# Patient Record
Sex: MALE | RURAL
[De-identification: names, ages, dates, MRNs, and addresses within clinical notes are randomized per-mention and may not be internally consistent; named-entity substitution may affect disease eponyms.]

---

## 2020-11-25 ENCOUNTER — APPOINTMENT (OUTPATIENT)
Age: 75
Setting detail: DERMATOLOGY
End: 2020-11-30

## 2020-11-25 VITALS — TEMPERATURE: 97.6 F

## 2020-11-25 DIAGNOSIS — D485 NEOPLASM OF UNCERTAIN BEHAVIOR OF SKIN: ICD-10-CM

## 2020-11-25 DIAGNOSIS — D18.0 HEMANGIOMA: ICD-10-CM

## 2020-11-25 DIAGNOSIS — L82.1 OTHER SEBORRHEIC KERATOSIS: ICD-10-CM

## 2020-11-25 DIAGNOSIS — L57.0 ACTINIC KERATOSIS: ICD-10-CM

## 2020-11-25 PROBLEM — D48.5 NEOPLASM OF UNCERTAIN BEHAVIOR OF SKIN: Status: ACTIVE | Noted: 2020-11-25

## 2020-11-25 PROBLEM — D18.01 HEMANGIOMA OF SKIN AND SUBCUTANEOUS TISSUE: Status: ACTIVE | Noted: 2020-11-25

## 2020-11-25 PROCEDURE — 17004 DESTROY PREMAL LESIONS 15/>: CPT

## 2020-11-25 PROCEDURE — OTHER MIPS QUALITY: OTHER

## 2020-11-25 PROCEDURE — 99203 OFFICE O/P NEW LOW 30 MIN: CPT | Mod: 25

## 2020-11-25 PROCEDURE — 11103 TANGNTL BX SKIN EA SEP/ADDL: CPT

## 2020-11-25 PROCEDURE — OTHER BIOPSY BY SHAVE METHOD: OTHER

## 2020-11-25 PROCEDURE — OTHER LIQUID NITROGEN: OTHER

## 2020-11-25 PROCEDURE — OTHER COUNSELING: OTHER

## 2020-11-25 PROCEDURE — 11102 TANGNTL BX SKIN SINGLE LES: CPT | Mod: 59

## 2020-11-25 PROCEDURE — OTHER REASSURANCE: OTHER

## 2020-11-25 ASSESSMENT — LOCATION SIMPLE DESCRIPTION DERM
LOCATION SIMPLE: LEFT EAR
LOCATION SIMPLE: RIGHT FOREARM
LOCATION SIMPLE: NOSE
LOCATION SIMPLE: RIGHT HAND
LOCATION SIMPLE: SCALP
LOCATION SIMPLE: LEFT CHEEK
LOCATION SIMPLE: UPPER BACK
LOCATION SIMPLE: LEFT FOREARM
LOCATION SIMPLE: RIGHT TEMPLE
LOCATION SIMPLE: RIGHT EAR
LOCATION SIMPLE: RIGHT FOREHEAD
LOCATION SIMPLE: ANTERIOR SCALP
LOCATION SIMPLE: CHEST
LOCATION SIMPLE: INFERIOR FOREHEAD
LOCATION SIMPLE: LEFT HAND
LOCATION SIMPLE: RIGHT SCALP
LOCATION SIMPLE: RIGHT NOSE

## 2020-11-25 ASSESSMENT — LOCATION DETAILED DESCRIPTION DERM
LOCATION DETAILED: RIGHT LATERAL TEMPLE
LOCATION DETAILED: LEFT SUPERIOR HELIX
LOCATION DETAILED: RIGHT CENTRAL FRONTAL SCALP
LOCATION DETAILED: RIGHT CENTRAL PARIETAL SCALP
LOCATION DETAILED: INFERIOR THORACIC SPINE
LOCATION DETAILED: LEFT MEDIAL MALAR CHEEK
LOCATION DETAILED: INFERIOR MID FOREHEAD
LOCATION DETAILED: SUPERIOR THORACIC SPINE
LOCATION DETAILED: RIGHT ANTITRAGUS
LOCATION DETAILED: LEFT PROXIMAL DORSAL FOREARM
LOCATION DETAILED: LEFT CAVUM CONCHA
LOCATION DETAILED: LEFT LATERAL INFERIOR CHEST
LOCATION DETAILED: RIGHT RADIAL DORSAL HAND
LOCATION DETAILED: LEFT EXTERNAL AUDITORY CANAL
LOCATION DETAILED: RIGHT SUPERIOR MEDIAL FOREHEAD
LOCATION DETAILED: RIGHT NASAL SIDEWALL
LOCATION DETAILED: LEFT SCAPHA
LOCATION DETAILED: RIGHT PROXIMAL DORSAL FOREARM
LOCATION DETAILED: RIGHT SUPERIOR HELIX
LOCATION DETAILED: RIGHT SUPERIOR PARIETAL SCALP
LOCATION DETAILED: LEFT DISTAL DORSAL FOREARM
LOCATION DETAILED: MID-FRONTAL SCALP
LOCATION DETAILED: LEFT SUPERIOR CRUS OF ANTIHELIX
LOCATION DETAILED: RIGHT MEDIAL FOREHEAD
LOCATION DETAILED: NASAL INFRATIP
LOCATION DETAILED: LEFT RADIAL DORSAL HAND

## 2020-11-25 ASSESSMENT — LOCATION ZONE DERM
LOCATION ZONE: EAR
LOCATION ZONE: NOSE
LOCATION ZONE: ARM
LOCATION ZONE: TRUNK
LOCATION ZONE: HAND
LOCATION ZONE: SCALP
LOCATION ZONE: FACE

## 2020-11-25 NOTE — PROCEDURE: BIOPSY BY SHAVE METHOD
Cryotherapy Text: The wound bed was treated with cryotherapy after the biopsy was performed.
Hide Topical Anesthesia?: No
Post-Care Instructions: I reviewed with the patient in detail post-care instructions. Patient is to keep the biopsy site dry overnight, and then apply bacitracin twice daily until healed. Patient may apply hydrogen peroxide soaks to remove any crusting.
Electrodesiccation And Curettage Text: The wound bed was treated with electrodesiccation and curettage after the biopsy was performed.
Anesthesia Type: 1% lidocaine with epinephrine
Biopsy Method: 10 blade
Dressing: bandage
Size Of Lesion In Cm: 0.3
Anesthesia Volume In Cc: 0.5
Consent: Written consent was obtained and risks were reviewed including but not limited to scarring, infection, bleeding, scabbing, incomplete removal, nerve damage and allergy to anesthesia.  Clinical evaluation reveals changes suspicious for rule out provided in pathology requisition.  Intent of procedure is to obtain tissue sample for histopathic examination.  A skin biopsy is considered a necessary and appropriate to clarify the diagnosis.
Biopsy Type: H and E
Hemostasis: Electrocautery
Detail Level: Detailed
Depth Of Biopsy: dermis
Silver Nitrate Text: The wound bed was treated with silver nitrate after the biopsy was performed.
Billing Type: Third-Party Bill
Additional Anesthesia Volume In Cc (Will Not Render If 0): 0
Information: Selecting Yes will display possible errors in your note based on the variables you have selected. This validation is only offered as a suggestion for you. PLEASE NOTE THAT THE VALIDATION TEXT WILL BE REMOVED WHEN YOU FINALIZE YOUR NOTE. IF YOU WANT TO FAX A PRELIMINARY NOTE YOU WILL NEED TO TOGGLE THIS TO 'NO' IF YOU DO NOT WANT IT IN YOUR FAXED NOTE.
Electrodesiccation Text: The wound bed was treated with electrodesiccation after the biopsy was performed.
Type Of Destruction Used: Curettage
X Size Of Lesion In Cm: 0.2
Curettage Text: The wound bed was treated with curettage after the biopsy was performed.
Notification Instructions: Patient will be notified of biopsy results. However, patient instructed to call the office if not contacted within 2 weeks.
Was A Bandage Applied: Yes
Wound Care: Vaseline

## 2020-11-25 NOTE — PROCEDURE: MIPS QUALITY
Quality 111:Pneumonia Vaccination Status For Older Adults: Pneumococcal Vaccination not Administered or Previously Received, Reason not Otherwise Specified
Quality 226: Preventive Care And Screening: Tobacco Use: Screening And Cessation Intervention: Patient screened for tobacco use and is an ex/non-smoker
Quality 110: Preventive Care And Screening: Influenza Immunization: Influenza Immunization not Administered because Patient Refused.
Quality 47: Advance Care Plan: Advance Care Planning discussed and documented; advance care plan or surrogate decision maker documented in the medical record.
Detail Level: Detailed

## 2020-11-25 NOTE — HPI: SKIN LESION
How Severe Is Your Skin Lesion?: mild
Has Your Skin Lesion Been Treated?: not been treated
Is This A New Presentation, Or A Follow-Up?: Skin Lesion
How Severe Is Your Skin Lesion?: severe
Is This A New Presentation, Or A Follow-Up?: Skin Lesions

## 2021-01-11 ENCOUNTER — APPOINTMENT (OUTPATIENT)
Age: 76
Setting detail: DERMATOLOGY
End: 2021-01-13

## 2021-01-11 ENCOUNTER — APPOINTMENT (OUTPATIENT)
Age: 76
Setting detail: DERMATOLOGY
End: 2021-01-12

## 2021-01-11 VITALS — TEMPERATURE: 97.8 F

## 2021-01-11 DIAGNOSIS — D18.0 HEMANGIOMA: ICD-10-CM

## 2021-01-11 DIAGNOSIS — L82.1 OTHER SEBORRHEIC KERATOSIS: ICD-10-CM

## 2021-01-11 PROBLEM — C44.311 BASAL CELL CARCINOMA OF SKIN OF NOSE: Status: ACTIVE | Noted: 2021-01-11

## 2021-01-11 PROBLEM — C44.329 SQUAMOUS CELL CARCINOMA OF SKIN OF OTHER PARTS OF FACE: Status: ACTIVE | Noted: 2021-01-11

## 2021-01-11 PROBLEM — D18.01 HEMANGIOMA OF SKIN AND SUBCUTANEOUS TISSUE: Status: ACTIVE | Noted: 2021-01-11

## 2021-01-11 PROCEDURE — 77285 THER RAD SIMULAJ FIELD INTRM: CPT

## 2021-01-11 PROCEDURE — G6001 ECHO GUIDANCE RADIOTHERAPY: HCPCS

## 2021-01-11 PROCEDURE — OTHER TREATMENT REGIMEN: OTHER

## 2021-01-11 PROCEDURE — OTHER REASSURANCE: OTHER

## 2021-01-11 PROCEDURE — OTHER COUNSELING: OTHER

## 2021-01-11 PROCEDURE — OTHER DEFER: OTHER

## 2021-01-11 PROCEDURE — 99213 OFFICE O/P EST LOW 20 MIN: CPT | Mod: 25

## 2021-01-11 PROCEDURE — 77262 THER RADIOLOGY TX PLNG INTRM: CPT

## 2021-01-11 PROCEDURE — OTHER SUPERFICIAL RADIATION TREATMENT: OTHER

## 2021-01-11 PROCEDURE — OTHER FOLLOW UP FOR NEXT VISIT: OTHER

## 2021-01-11 PROCEDURE — 99213 OFFICE O/P EST LOW 20 MIN: CPT

## 2021-01-11 PROCEDURE — OTHER MIPS QUALITY: OTHER

## 2021-01-11 PROCEDURE — 77300 RADIATION THERAPY DOSE PLAN: CPT

## 2021-01-11 PROCEDURE — 77334 RADIATION TREATMENT AID(S): CPT

## 2021-01-11 ASSESSMENT — LOCATION DETAILED DESCRIPTION DERM
LOCATION DETAILED: SUPERIOR THORACIC SPINE
LOCATION DETAILED: INFERIOR THORACIC SPINE
LOCATION DETAILED: LEFT LATERAL INFERIOR CHEST
LOCATION DETAILED: LEFT PROXIMAL DORSAL FOREARM
LOCATION DETAILED: RIGHT NASAL SIDEWALL
LOCATION DETAILED: RIGHT PROXIMAL DORSAL FOREARM

## 2021-01-11 ASSESSMENT — LOCATION ZONE DERM
LOCATION ZONE: NOSE
LOCATION ZONE: TRUNK
LOCATION ZONE: ARM

## 2021-01-11 ASSESSMENT — LOCATION SIMPLE DESCRIPTION DERM
LOCATION SIMPLE: RIGHT FOREARM
LOCATION SIMPLE: CHEST
LOCATION SIMPLE: RIGHT NOSE
LOCATION SIMPLE: UPPER BACK
LOCATION SIMPLE: LEFT FOREARM

## 2021-01-11 NOTE — PROCEDURE: TREATMENT REGIMEN
Detail Level: Zone
Plan: SIM with u/s\\n\\nPer the request of Dr. Suarez, patient was seen today for Superficial Radiation Therapy requiring simulation in preparation for treatment of specific diseased site(s). Simulation is necessary to determine correct patient and treatment portal positioning, deliver safe and effective radiation therapy. \\n\\nA high frequency ultrasound image was acquired today for three-dimensional evaluation of tumor volume and response to treatment, in addition to geometric accuracy of field placement (CPT® ). Physician evaluation of the ultrasound tumor depth will be ongoing through the course of treatment and is deemed medically necessary ensuring efficacy of treatment. Ultrasound was used to confirm treatment location and determine depth of treatment. \\n\\Nehemiah appropriate custom blocking and treatment parameters were verified by the Radiation Therapist. Today’s visit is for Simulation and planning for radiation therapy.  Questions were answered and concerns were addressed.  Patient was evaluated based on listed criteria and is a suitable candidate to begin SRT.   \\n\\nUS Depth:  0.97mm\\n
Plan: SIM with u/s\\n\\nPer the request of Dr. Suarez, patient was seen today for Superficial Radiation Therapy requiring simulation in preparation for treatment of specific diseased site(s). Simulation is necessary to determine correct patient and treatment portal positioning, deliver safe and effective radiation therapy. \\n\\nA high frequency ultrasound image was acquired today for three-dimensional evaluation of tumor volume and response to treatment, in addition to geometric accuracy of field placement (CPT® ). Physician evaluation of the ultrasound tumor depth will be ongoing through the course of treatment and is deemed medically necessary ensuring efficacy of treatment. Ultrasound was used to confirm treatment location and determine depth of treatment. \\n\\Nehemiah appropriate custom blocking and treatment parameters were verified by the Radiation Therapist. Today’s visit is for Simulation and planning for radiation therapy.  Questions were answered and concerns were addressed.  Patient was evaluated based on listed criteria and is a suitable candidate to begin SRT.   \\n\\nUS Depth: 0.85 mm\\n

## 2021-01-11 NOTE — PROCEDURE: SUPERFICIAL RADIATION TREATMENT
Render Text From Evaluation And Management Tab (Will Not Bill 84321): No
Field Size (Applicator): 2.5 cm
Daily Fractionated Dose (Optional- Include Units): 270.90 cGy
Assessment: Appropriate reaction
Custom Shielding Afterword Text Will Not Be Included With Simple Simulations (X X Y Cm............): port to correlate with the lesion size, including treatment margin. The custom lead shield is adequate to accommodate the appropriate applicator and provide adequate shielding around the treatment site. Additional shielding (as noted below) is used to protect sensitive, normal tissues.
Fractionation Number (Evaluation): 5
Dose Per Fractionation In Cgy (Optional): 270.45
Prescription Used: 1
Field Number: 2
Simple Simulation Afterword Text Will Be Included With Simple Simulations (Indications............): The patient had a complete consultation regarding all applicable modalities for the treatment of their skin cancer and based on a variety of factors including the type of tumor, size, and location, the relevant medical history as well as local tissue factors, the functional status of the individual, the ability to perform necessary postoperative wound instructions and the need for simultaneous treatments as well as overall wound healing status, it was determined that the patient would begin radiation therapy treatment for skin cancer.  A full simulation and treatment device design was performed including the determination and formulation of appropriate simple and complex devices including lead shield of 0.762 mm thickness to form molded customized shielding to specifically correlate with the lesion size including treatment margin.  The custom lead shield is adequate to accommodate the appropriate applicator and provide adequate shielding around the treatment site.  The specific field applicator, shields, and devices both simple and complex as well as the specific patient setup is outlined below.  The patient was given a full consent for superficial radiation to both verbally and in writing and the full determination of patient's eligibility for treatment and selection is outlined on the patient eligibility and treatment selection form.  The specific superficial radiotherapy prescription was determined and was documented on the superficial radiotherapy prescription form.  A treatment calculation was also performed and documented on the treatment calculation form.  Based on the prescription, the patient was scheduled for a series of fractional treatments.
Computed Treatment Time In Min (Will Render The Same As Calculated Treatment Time If Left Blank): 0.45 min
Dose Per Fractionation In Cgy (Optional): 270.9
Render Prescriptions In Note?: Yes
Information: Selecting Yes will display possible errors in your note based on the variables you have selected. This validation is only offered as a suggestion for you. PLEASE NOTE THAT THE VALIDATION TEXT WILL BE REMOVED WHEN YOU FINALIZE YOUR NOTE. IF YOU WANT TO FAX A PRELIMINARY NOTE YOU WILL NEED TO TOGGLE THIS TO 'NO' IF YOU DO NOT WANT IT IN YOUR FAXED NOTE.
Fractionation Number: 0
Treatment Margins In Cm: 0.5
Detail Level: Detailed
Energy (Optional-Please Include Units): 70
Dimensions-Y Axis In Cm: 0.8
Intro Statement (Will Not Render If Left Blank): The patient is undergoing superficial radiation therapy for skin cancer and presents for weekly evaluation and management.  Per protocol and as documented on the flow sheet, the patient was questioned as to subjective redness, pruritus, pain, drainage, fatigue, or any other symptoms.  Objectively, the radiation area was evaluated with regards to erythema, atrophy, scale, crusting, erosion, ulceration, edema, purpura, tenderness, warmth, drainage, and any other findings.  The plan was extensively reviewed including the dose, and dosing schedule.  The simulation and clinical setup was also reviewed as was the external and any internal shields and based on this review the appropriateness and sufficiency of treatment was determined.
Time Dose Fractionation (Optional- Include Units If Applicable): 96
Energy (Optional-Please Include Units): 70 KV
Depth (Optional-Please Include Units): 0.97 mm
Simple Simulation Preamble Text Will Be Included With Simple Simulations (.......... Indications): Simple simulation was performed today for the following reasons:
Total Number Of Fractions: 20
Functional Status: 1 (ambulatory, light activity)
Pathology Override (Pathology Will Render As Diagnosis Name If Left Blank): BCC nodular
Shielding Size (Optional- Include Units): none
Total Number Of Fractions Rx 2: 15
Treatment Device Design After Initial Simulation Justification (Will Render If Bill For Treatment Devices = Yes): The patient is status post radiation simulation and is evaluated as to the use of additional devices for shielding and placement for radiation therapy.
Field Size (Applicator): 1.5 cm
Field Size (Applicator): 2.0 cm
Fractions / Week: 3
Computed Treatment Time In Min (Will Render The Same As Calculated Treatment Time If Left Blank): 0.43 min
Patient Positioning: Sitting
Total Dose (Optional-Please Include Units): 5409.0
Depth (Optional-Please Include Units): .85 mm
Custom Shielding Preamble Text Will Not Be Included With Simple Simulations (.......... X X Y Cm): A lead shield of 0.762 mm thickness is utilized to form a molded, custom shield with a
Daily Fractionated Dose (Optional- Include Units): 270.45 cGy
Treatment Margins In Cm: 0.35
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
Total Dose (Optional-Please Include Units): 5418.0

## 2021-01-11 NOTE — PROCEDURE: MIPS QUALITY
Quality 110: Preventive Care And Screening: Influenza Immunization: Influenza Immunization not Administered because Patient Refused.
Quality 111:Pneumonia Vaccination Status For Older Adults: Pneumococcal Vaccination not Administered or Previously Received, Reason not Otherwise Specified
Detail Level: Detailed
Quality 47: Advance Care Plan: Advance Care Planning discussed and documented; advance care plan or surrogate decision maker documented in the medical record.
Quality 226: Preventive Care And Screening: Tobacco Use: Screening And Cessation Intervention: Patient screened for tobacco use and is an ex/non-smoker

## 2021-01-11 NOTE — PROCEDURE: TREATMENT REGIMEN
Plan: Discussed surgery vs SRT \\nPatient elects to proceed with SRT\\n\\nSize 0.8 x margins 0.5
Detail Level: Zone
Plan: Discussed surgery vs SRT \\nPatient elects to proceed with SRT\\n\\nSize 1.0 x 0.5 margin

## 2021-01-13 ENCOUNTER — APPOINTMENT (OUTPATIENT)
Age: 76
Setting detail: DERMATOLOGY
End: 2021-01-20

## 2021-01-13 PROBLEM — C44.329 SQUAMOUS CELL CARCINOMA OF SKIN OF OTHER PARTS OF FACE: Status: ACTIVE | Noted: 2021-01-13

## 2021-01-13 PROBLEM — C44.311 BASAL CELL CARCINOMA OF SKIN OF NOSE: Status: ACTIVE | Noted: 2021-01-13

## 2021-01-13 PROCEDURE — G6001 ECHO GUIDANCE RADIOTHERAPY: HCPCS

## 2021-01-13 PROCEDURE — OTHER FOLLOW UP FOR NEXT VISIT: OTHER

## 2021-01-13 PROCEDURE — OTHER SUPERFICIAL RADIATION TREATMENT: OTHER

## 2021-01-13 PROCEDURE — OTHER TREATMENT REGIMEN: OTHER

## 2021-01-13 PROCEDURE — 77280 THER RAD SIMULAJ FIELD SMPL: CPT

## 2021-01-13 PROCEDURE — 77401 RADIATION TX DELIVERY SUPFC: CPT

## 2021-01-13 NOTE — PROCEDURE: SUPERFICIAL RADIATION TREATMENT
Total Number Of Fractions Rx 2: 15
Render Text From Evaluation And Management Tab (Will Not Bill 54341): No
Computed Treatment Time In Min (Will Render The Same As Calculated Treatment Time If Left Blank): 0.43 min
Fractionation Number: 1
Field Size (Applicator): 2.5 cm
Field Size (Applicator): 1.5 cm
Initial Radiation Treatment Planning (Will Render If Bill Simulation = Yes): The patient had a complete consultation regarding all applicable modalities for the treatment of their skin cancer and based on a variety of factors including the type of tumor, size, and location, the relevant medical history as well as local tissue factors, the functional status of the individual, the ability to perform necessary postoperative wound instructions and the need for simultaneous treatments as well as overall wound healing status, it was determined that the patient would begin radiation therapy treatment for skin cancer.  A full simulation and treatment device design was performed including the determination and formulation of appropriate simple and complex devices including lead shield of 0.762 mm thickness to form molded customized shielding to specifically correlate with the lesion size including treatment margin.  The custom lead shield is adequate to accommodate the appropriate applicator and provide adequate shielding around the treatment site.  The specific field applicator, shields, and devices both simple and complex as well as the specific patient setup is outlined below.  The patient was given a full consent for superficial radiation to both verbally and in writing and the full determination of patient's eligibility for treatment and selection is outlined on the patient eligibility and treatment selection form.  The specific superficial radiotherapy prescription was determined and was documented on the superficial radiotherapy prescription form.  A treatment calculation was also performed and documented on the treatment calculation form.  Based on the prescription, the patient was scheduled for a series of fractional treatments.
Fractions / Week: 3
Custom Shielding Afterword Text Will Not Be Included With Simple Simulations (X X Y Cm............): port to correlate with the lesion size, including treatment margin. The custom lead shield is adequate to accommodate the appropriate applicator and provide adequate shielding around the treatment site. Additional shielding (as noted below) is used to protect sensitive, normal tissues.
Assessment: Appropriate reaction
Fractions / Week Rx 4: 5
Cumulative Dose In Cgy (Optional): 270.45
Treatment Margins In Cm: 0.35
Total Number Of Fractions: 20
Treatment Device Design After Initial Simulation Justification (Will Render If Bill For Treatment Devices = Yes): The patient is status post radiation simulation and is evaluated as to the use of additional devices for shielding and placement for radiation therapy.
Energy (Optional-Please Include Units): 70
Intro Statement (Will Not Render If Left Blank): The patient is undergoing superficial radiation therapy for skin cancer and presents for weekly evaluation and management.  Per protocol and as documented on the flow sheet, the patient was questioned as to subjective redness, pruritus, pain, drainage, fatigue, or any other symptoms.  Objectively, the radiation area was evaluated with regards to erythema, atrophy, scale, crusting, erosion, ulceration, edema, purpura, tenderness, warmth, drainage, and any other findings.  The plan was extensively reviewed including the dose, and dosing schedule.  The simulation and clinical setup was also reviewed as was the external and any internal shields and based on this review the appropriateness and sufficiency of treatment was determined.
Simple Simulation Preamble Text Will Be Included With Simple Simulations (.......... Indications): Simple simulation was performed today for the following reasons:
Custom Shielding Preamble Text Will Not Be Included With Simple Simulations (.......... X X Y Cm): A lead shield of 0.762 mm thickness is utilized to form a molded, custom shield with a
Include Rx 2 When Rendering Additional Prescriptions: Yes
Treatment Margins In Cm: 0.5
Patient Positioning: Sitting
Information: Selecting Yes will display possible errors in your note based on the variables you have selected. This validation is only offered as a suggestion for you. PLEASE NOTE THAT THE VALIDATION TEXT WILL BE REMOVED WHEN YOU FINALIZE YOUR NOTE. IF YOU WANT TO FAX A PRELIMINARY NOTE YOU WILL NEED TO TOGGLE THIS TO 'NO' IF YOU DO NOT WANT IT IN YOUR FAXED NOTE.
Time Dose Fractionation (Optional- Include Units If Applicable): 96
Dimensions-X Axis In Cm: 0.8
Cumulative Dose In Cgy (Optional): 270.90
Energy (Optional-Please Include Units): 70 KV
Total Dose (Optional-Please Include Units): 5409.0
Shielding Size (Optional- Include Units): 2.0 cm
Detail Level: Detailed
Port Dimensions-X Axis In Cm: 2
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
Treatment Time In Min (Optional): 0.43
Functional Status: 1 (ambulatory, light activity)
Daily Fractionated Dose (Optional- Include Units): 270.90 cGy
Computed Treatment Time In Min (Will Render The Same As Calculated Treatment Time If Left Blank): 0.45 min
Pathology Override (Pathology Will Render As Diagnosis Name If Left Blank): BCC nodular
Depth (Optional-Please Include Units): .85 mm
Total Dose (Optional-Please Include Units): 5418.0
Day Of The Week Treatment Administered: Wednesday
Treatment Time In Min (Optional): 0.45
Energy (Include Units): 70kV
Depth (Optional-Please Include Units): 0.97 mm
Dose Per Fractionation In Cgy (Optional): 270.9
Daily Fractionated Dose (Optional- Include Units): 270.45 cGy
Shielding Size (Optional- Include Units): none

## 2021-01-13 NOTE — PROCEDURE: TREATMENT REGIMEN
Plan: Treatment with u/s\\n\\nThis patient has been treated today with image guided superficial radiation therapy for non-melanoma\\nskin cancer.\\n\\nWritten informed consent has been previously obtained from this patient for this treatment. This\\nconsent is documented in the patient’s chart. The patient gave verbal consent to continue treatment\\ntoday.\\n\\nThe patient was treated with a specific radiation dose and setup as prescribed by the provider listed on\\nthis visit note. A Radiation Therapist performed administration of radiation under supervision of\\nprovider. The treatment parameters and cumulative dose are indicated above.\\nPrior to administering the radiation, the patient underwent a verification therapeutic radiology\\nsimulation-aided field setting defining relevant normal and abnormal target anatomy and acquiring\\nimages with high frequency ultrasound in addition to data necessary developing optimal radiation\\ntreatment process for the patient. This process includes verification of the treatment port(s) and proper\\ntreatment positioning. All treatment ports were photographed within electronic medical record. The\\npatient’s customized lead blocking along with gross tumor volume and margin was\\nconfirmed. Considering superficial radiotherapy is clinical in setup, this requires physician and\\nradiation therapist to clarify location interest being treated against initial images, pathology and\\npatient anatomy. Care was taken ensuring fields treated were geometrically accurate and properly\\npositioned using therapeutic radiology simulation-aided field setting verification per fraction. This\\nprocess is also utilized to determine if any prescription or setup changes are necessary. These steps are,\\ntherefore, medically necessary ensuring safe and effective administration of radiation. Ongoing\\ntherapeutic radiology simulation-aided field setting verification is ordered throughout course of\\ntherapy.\\n\\nA high frequency ultrasound image was acquired today for two-dimensional evaluation of the tumor\\nvolume and response to treatment, in addition to geometric accuracy of field placement. US depth Is\\n0.67 mm, which is +/- 0.18 mm in difference from previous imaging. The field placement and\\nultrasound imaging, per fraction, is separate and distinct from the initial simulation, and is an\\nimportant task in providing safe administration of superficial radiation therapy. Physician evaluation of\\nthe ultrasound tumor depth will be ongoing throughout the course of treatment, and is deemed\\nmedically necessary in order to ensure the efficacy of treatment and any necessary changes. Today’s\\nimage was evaluated for determination of continuation of treatment with the current plan or with\\nnecessary changes as appropriate. According to provider review of verification therapeutic radiology\\nsimulation-aided field setting and imaging, no change is required. Additionally, the use of ultrasound visualization \\nand targeted assessment allows the patient to be able to see their cancer(s) progress, encouraging patient to\\ncomplete and maintain compliance through full course of radiotherapy.\\n\\nPer Dr. Suarez, continued ultrasound guidance and therapeutic radiology simulation-aided field setting\\nverification per fraction is required for field placement, measurement of tumor depth, progress and\\nacute effect monitoring.\\n
Detail Level: Zone
Plan: Treatment with u/s\\n\\nThis patient has been treated today with image guided superficial radiation therapy for non-melanoma\\nskin cancer.\\n\\nWritten informed consent has been previously obtained from this patient for this treatment. This\\nconsent is documented in the patient’s chart. The patient gave verbal consent to continue treatment\\ntoday.\\n\\nThe patient was treated with a specific radiation dose and setup as prescribed by the provider listed on\\nthis visit note. A Radiation Therapist performed administration of radiation under supervision of\\nprovider. The treatment parameters and cumulative dose are indicated above.\\nPrior to administering the radiation, the patient underwent a verification therapeutic radiology\\nsimulation-aided field setting defining relevant normal and abnormal target anatomy and acquiring\\nimages with high frequency ultrasound in addition to data necessary developing optimal radiation\\ntreatment process for the patient. This process includes verification of the treatment port(s) and proper\\ntreatment positioning. All treatment ports were photographed within electronic medical record. The\\npatient’s customized lead blocking along with gross tumor volume and margin was\\nconfirmed. Considering superficial radiotherapy is clinical in setup, this requires physician and\\nradiation therapist to clarify location interest being treated against initial images, pathology and\\npatient anatomy. Care was taken ensuring fields treated were geometrically accurate and properly\\npositioned using therapeutic radiology simulation-aided field setting verification per fraction. This\\nprocess is also utilized to determine if any prescription or setup changes are necessary. These steps are,\\ntherefore, medically necessary ensuring safe and effective administration of radiation. Ongoing\\ntherapeutic radiology simulation-aided field setting verification is ordered throughout course of\\ntherapy.\\n\\nA high frequency ultrasound image was acquired today for two-dimensional evaluation of the tumor\\nvolume and response to treatment, in addition to geometric accuracy of field placement. US depth Is\\n0.92 mm, which is +/- 0.05 mm in difference from previous imaging. The field placement and\\nultrasound imaging, per fraction, is separate and distinct from the initial simulation, and is an\\nimportant task in providing safe administration of superficial radiation therapy. Physician evaluation of\\nthe ultrasound tumor depth will be ongoing throughout the course of treatment, and is deemed\\nmedically necessary in order to ensure the efficacy of treatment and any necessary changes. Today’s\\nimage was evaluated for determination of continuation of treatment with the current plan or with\\nnecessary changes as appropriate. According to provider review of verification therapeutic radiology\\nsimulation-aided field setting and imaging, no change is required. Additionally, the use of ultrasound visualization \\nand targeted assessment allows the patient to be able to see their cancer(s) progress, encouraging patient to\\ncomplete and maintain compliance through full course of radiotherapy.\\n\\nPer Dr. Suarez, continued ultrasound guidance and therapeutic radiology simulation-aided field setting\\nverification per fraction is required for field placement, measurement of tumor depth, progress and\\nacute effect monitoring.\\n

## 2021-01-15 ENCOUNTER — APPOINTMENT (OUTPATIENT)
Age: 76
Setting detail: DERMATOLOGY
End: 2021-01-22

## 2021-01-15 VITALS — TEMPERATURE: 98 F

## 2021-01-15 PROBLEM — C44.311 BASAL CELL CARCINOMA OF SKIN OF NOSE: Status: ACTIVE | Noted: 2021-01-15

## 2021-01-15 PROBLEM — C44.329 SQUAMOUS CELL CARCINOMA OF SKIN OF OTHER PARTS OF FACE: Status: ACTIVE | Noted: 2021-01-15

## 2021-01-15 PROCEDURE — 77401 RADIATION TX DELIVERY SUPFC: CPT

## 2021-01-15 PROCEDURE — OTHER FOLLOW UP FOR NEXT VISIT: OTHER

## 2021-01-15 PROCEDURE — OTHER SUPERFICIAL RADIATION TREATMENT: OTHER

## 2021-01-15 PROCEDURE — 77280 THER RAD SIMULAJ FIELD SMPL: CPT

## 2021-01-15 PROCEDURE — OTHER TREATMENT REGIMEN: OTHER

## 2021-01-15 PROCEDURE — G6001 ECHO GUIDANCE RADIOTHERAPY: HCPCS

## 2021-01-15 NOTE — PROCEDURE: TREATMENT REGIMEN
Detail Level: Zone
Plan: Treatment with u/s\\n\\nThis patient has been treated today with image guided superficial radiation therapy for non-melanoma\\nskin cancer.\\n\\nWritten informed consent has been previously obtained from this patient for this treatment. This\\nconsent is documented in the patient’s chart. The patient gave verbal consent to continue treatment\\ntoday.\\n\\nThe patient was treated with a specific radiation dose and setup as prescribed by the provider listed on\\nthis visit note. A Radiation Therapist performed administration of radiation under supervision of\\nprovider. The treatment parameters and cumulative dose are indicated above.\\nPrior to administering the radiation, the patient underwent a verification therapeutic radiology\\nsimulation-aided field setting defining relevant normal and abnormal target anatomy and acquiring\\nimages with high frequency ultrasound in addition to data necessary developing optimal radiation\\ntreatment process for the patient. This process includes verification of the treatment port(s) and proper\\ntreatment positioning. All treatment ports were photographed within electronic medical record. The\\npatient’s customized lead blocking along with gross tumor volume and margin was\\nconfirmed. Considering superficial radiotherapy is clinical in setup, this requires physician and\\nradiation therapist to clarify location interest being treated against initial images, pathology and\\npatient anatomy. Care was taken ensuring fields treated were geometrically accurate and properly\\npositioned using therapeutic radiology simulation-aided field setting verification per fraction. This\\nprocess is also utilized to determine if any prescription or setup changes are necessary. These steps are,\\ntherefore, medically necessary ensuring safe and effective administration of radiation. Ongoing\\ntherapeutic radiology simulation-aided field setting verification is ordered throughout course of\\ntherapy.\\n\\nA high frequency ultrasound image was acquired today for two-dimensional evaluation of the tumor\\nvolume and response to treatment, in addition to geometric accuracy of field placement. US depth Is\\n0.74 mm, which is +/- 0.18 mm in difference from previous imaging. The field placement and\\nultrasound imaging, per fraction, is separate and distinct from the initial simulation, and is an\\nimportant task in providing safe administration of superficial radiation therapy. Physician evaluation of\\nthe ultrasound tumor depth will be ongoing throughout the course of treatment, and is deemed\\nmedically necessary in order to ensure the efficacy of treatment and any necessary changes. Today’s\\nimage was evaluated for determination of continuation of treatment with the current plan or with\\nnecessary changes as appropriate. According to provider review of verification therapeutic radiology\\nsimulation-aided field setting and imaging, no change is required. Additionally, the use of ultrasound visualization \\nand targeted assessment allows the patient to be able to see their cancer(s) progress, encouraging patient to\\ncomplete and maintain compliance through full course of radiotherapy.\\n\\nPer Dr. Suarez, continued ultrasound guidance and therapeutic radiology simulation-aided field setting\\nverification per fraction is required for field placement, measurement of tumor depth, progress and\\nacute effect monitoring.\\n
Plan: Treatment with u/s\\n\\nThis patient has been treated today with image guided superficial radiation therapy for non-melanoma\\nskin cancer.\\n\\nWritten informed consent has been previously obtained from this patient for this treatment. This\\nconsent is documented in the patient’s chart. The patient gave verbal consent to continue treatment\\ntoday.\\n\\nThe patient was treated with a specific radiation dose and setup as prescribed by the provider listed on\\nthis visit note. A Radiation Therapist performed administration of radiation under supervision of\\nprovider. The treatment parameters and cumulative dose are indicated above.\\nPrior to administering the radiation, the patient underwent a verification therapeutic radiology\\nsimulation-aided field setting defining relevant normal and abnormal target anatomy and acquiring\\nimages with high frequency ultrasound in addition to data necessary developing optimal radiation\\ntreatment process for the patient. This process includes verification of the treatment port(s) and proper\\ntreatment positioning. All treatment ports were photographed within electronic medical record. The\\npatient’s customized lead blocking along with gross tumor volume and margin was\\nconfirmed. Considering superficial radiotherapy is clinical in setup, this requires physician and\\nradiation therapist to clarify location interest being treated against initial images, pathology and\\npatient anatomy. Care was taken ensuring fields treated were geometrically accurate and properly\\npositioned using therapeutic radiology simulation-aided field setting verification per fraction. This\\nprocess is also utilized to determine if any prescription or setup changes are necessary. These steps are,\\ntherefore, medically necessary ensuring safe and effective administration of radiation. Ongoing\\ntherapeutic radiology simulation-aided field setting verification is ordered throughout course of\\ntherapy.\\n\\nA high frequency ultrasound image was acquired today for two-dimensional evaluation of the tumor\\nvolume and response to treatment, in addition to geometric accuracy of field placement. US depth Is\\n1.06 mm, which is +/- 0.39 mm in difference from previous imaging. The field placement and\\nultrasound imaging, per fraction, is separate and distinct from the initial simulation, and is an\\nimportant task in providing safe administration of superficial radiation therapy. Physician evaluation of\\nthe ultrasound tumor depth will be ongoing throughout the course of treatment, and is deemed\\nmedically necessary in order to ensure the efficacy of treatment and any necessary changes. Today’s\\nimage was evaluated for determination of continuation of treatment with the current plan or with\\nnecessary changes as appropriate. According to provider review of verification therapeutic radiology\\nsimulation-aided field setting and imaging, no change is required. Additionally, the use of ultrasound visualization \\nand targeted assessment allows the patient to be able to see their cancer(s) progress, encouraging patient to\\ncomplete and maintain compliance through full course of radiotherapy.\\n\\nPer Dr. Suarez, continued ultrasound guidance and therapeutic radiology simulation-aided field setting\\nverification per fraction is required for field placement, measurement of tumor depth, progress and\\nacute effect monitoring.\\n

## 2021-01-15 NOTE — PROCEDURE: SUPERFICIAL RADIATION TREATMENT
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

## 2021-01-15 NOTE — PROCEDURE: SUPERFICIAL RADIATION TREATMENT
Computed Treatment Time In Min (Will Render The Same As Calculated Treatment Time If Left Blank): 0.45 min

## 2021-01-15 NOTE — PROCEDURE: SUPERFICIAL RADIATION TREATMENT
Computed Treatment Time In Min (Will Render The Same As Calculated Treatment Time If Left Blank): 0.43 min

## 2021-01-20 ENCOUNTER — APPOINTMENT (OUTPATIENT)
Age: 76
Setting detail: DERMATOLOGY
End: 2021-01-22

## 2021-01-20 VITALS — TEMPERATURE: 79.9 F

## 2021-01-20 PROBLEM — C44.311 BASAL CELL CARCINOMA OF SKIN OF NOSE: Status: ACTIVE | Noted: 2021-01-20

## 2021-01-20 PROBLEM — C44.329 SQUAMOUS CELL CARCINOMA OF SKIN OF OTHER PARTS OF FACE: Status: ACTIVE | Noted: 2021-01-20

## 2021-01-20 PROCEDURE — 77401 RADIATION TX DELIVERY SUPFC: CPT

## 2021-01-20 PROCEDURE — OTHER FOLLOW UP FOR NEXT VISIT: OTHER

## 2021-01-20 PROCEDURE — G6001 ECHO GUIDANCE RADIOTHERAPY: HCPCS

## 2021-01-20 PROCEDURE — OTHER TREATMENT REGIMEN: OTHER

## 2021-01-20 PROCEDURE — OTHER SUPERFICIAL RADIATION TREATMENT: OTHER

## 2021-01-20 PROCEDURE — 77280 THER RAD SIMULAJ FIELD SMPL: CPT

## 2021-01-20 NOTE — PROCEDURE: SUPERFICIAL RADIATION TREATMENT
Field Size (Applicator): 2.0 cm
Daily Fractionated Dose (Optional- Include Units): 270.90 cGy
Fractions / Week Rx 4: 5
Render Prescriptions In Note?: No
Initial Radiation Treatment Planning (Will Render If Bill Simulation = Yes): The patient had a complete consultation regarding all applicable modalities for the treatment of their skin cancer and based on a variety of factors including the type of tumor, size, and location, the relevant medical history as well as local tissue factors, the functional status of the individual, the ability to perform necessary postoperative wound instructions and the need for simultaneous treatments as well as overall wound healing status, it was determined that the patient would begin radiation therapy treatment for skin cancer.  A full simulation and treatment device design was performed including the determination and formulation of appropriate simple and complex devices including lead shield of 0.762 mm thickness to form molded customized shielding to specifically correlate with the lesion size including treatment margin.  The custom lead shield is adequate to accommodate the appropriate applicator and provide adequate shielding around the treatment site.  The specific field applicator, shields, and devices both simple and complex as well as the specific patient setup is outlined below.  The patient was given a full consent for superficial radiation to both verbally and in writing and the full determination of patient's eligibility for treatment and selection is outlined on the patient eligibility and treatment selection form.  The specific superficial radiotherapy prescription was determined and was documented on the superficial radiotherapy prescription form.  A treatment calculation was also performed and documented on the treatment calculation form.  Based on the prescription, the patient was scheduled for a series of fractional treatments.
Total Number Of Fractions Rx 3: 15
Day Of The Week Treatment Administered: Wednesday
Number Of Treatment Days: 1
Dose / Tx In Cgy (Optional): 270.90
Field Size (Applicator): 2.5 cm
Field Size (Applicator): 1.5 cm
Custom Shielding Preamble Text Will Not Be Included With Simple Simulations (.......... X X Y Cm): A lead shield of 0.762 mm thickness is utilized to form a molded, custom shield with a
Dimensions-Y Axis In Cm: 0.8
Energy (Optional-Please Include Units): 70
Port Dimensions-X Axis In Cm: 2
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
Pathology Override (Pathology Will Render As Diagnosis Name If Left Blank): BCC nodular
Total Dose (Optional-Please Include Units): 5409.0
Treatment Device Design After Initial Simulation Justification (Will Render If Bill For Treatment Devices = Yes): The patient is status post radiation simulation and is evaluated as to the use of additional devices for shielding and placement for radiation therapy.
Cumulative Dose In Cgy (Optional): 811.35
Total Dose (Optional-Please Include Units): 5418.0
Bill For Radiation Treatment: Yes
Simple Simulation Preamble Text Will Be Included With Simple Simulations (.......... Indications): Simple simulation was performed today for the following reasons:
Energy (Optional-Please Include Units): 70 KV
Custom Shielding Afterword Text Will Not Be Included With Simple Simulations (X X Y Cm............): port to correlate with the lesion size, including treatment margin. The custom lead shield is adequate to accommodate the appropriate applicator and provide adequate shielding around the treatment site. Additional shielding (as noted below) is used to protect sensitive, normal tissues.
Intro Statement (Will Not Render If Left Blank): The patient is undergoing superficial radiation therapy for skin cancer and presents for weekly evaluation and management.  Per protocol and as documented on the flow sheet, the patient was questioned as to subjective redness, pruritus, pain, drainage, fatigue, or any other symptoms.  Objectively, the radiation area was evaluated with regards to erythema, atrophy, scale, crusting, erosion, ulceration, edema, purpura, tenderness, warmth, drainage, and any other findings.  The plan was extensively reviewed including the dose, and dosing schedule.  The simulation and clinical setup was also reviewed as was the external and any internal shields and based on this review the appropriateness and sufficiency of treatment was determined.
Functional Status: 1 (ambulatory, light activity)
Treatment Margins In Cm: 0.35
Patient Positioning: Sitting
Computed Treatment Time In Min (Will Render The Same As Calculated Treatment Time If Left Blank): 0.45 min
Depth (Optional-Please Include Units): .85 mm
Dose Per Fractionation In Cgy (Optional): 270.9
Energy (Include Units): 70kV
Dose Per Fractionation In Cgy (Optional): 270.45
Fractionation Number: 3
Detail Level: Detailed
Treatment Time In Min (Optional): 0.45
Daily Fractionated Dose (Optional- Include Units): 270.45 cGy
Depth (Optional-Please Include Units): 0.97 mm
Information: Selecting Yes will display possible errors in your note based on the variables you have selected. This validation is only offered as a suggestion for you. PLEASE NOTE THAT THE VALIDATION TEXT WILL BE REMOVED WHEN YOU FINALIZE YOUR NOTE. IF YOU WANT TO FAX A PRELIMINARY NOTE YOU WILL NEED TO TOGGLE THIS TO 'NO' IF YOU DO NOT WANT IT IN YOUR FAXED NOTE.
Treatment Time / Fractionation (Optional- Include Units): 0.43 min
Assessment: Appropriate reaction
Treatment Margins In Cm: 0.5
Time Dose Fractionation (Optional- Include Units If Applicable): 96
Total Number Of Fractions: 20
Shielding Size (Optional- Include Units): none
Treatment Time In Min (Optional): 0.43

## 2021-01-20 NOTE — PROCEDURE: TREATMENT REGIMEN
Plan: Treatment with u/s\\n\\nThis patient has been treated today with image guided superficial radiation therapy for non-melanoma\\nskin cancer.\\n\\nWritten informed consent has been previously obtained from this patient for this treatment. This\\nconsent is documented in the patient’s chart. The patient gave verbal consent to continue treatment\\ntoday.\\n\\nThe patient was treated with a specific radiation dose and setup as prescribed by the provider listed on\\nthis visit note. A Radiation Therapist performed administration of radiation under supervision of\\nprovider. The treatment parameters and cumulative dose are indicated above.\\nPrior to administering the radiation, the patient underwent a verification therapeutic radiology\\nsimulation-aided field setting defining relevant normal and abnormal target anatomy and acquiring\\nimages with high frequency ultrasound in addition to data necessary developing optimal radiation\\ntreatment process for the patient. This process includes verification of the treatment port(s) and proper\\ntreatment positioning. All treatment ports were photographed within electronic medical record. The\\npatient’s customized lead blocking along with gross tumor volume and margin was\\nconfirmed. Considering superficial radiotherapy is clinical in setup, this requires physician and\\nradiation therapist to clarify location interest being treated against initial images, pathology and\\npatient anatomy. Care was taken ensuring fields treated were geometrically accurate and properly\\npositioned using therapeutic radiology simulation-aided field setting verification per fraction. This\\nprocess is also utilized to determine if any prescription or setup changes are necessary. These steps are,\\ntherefore, medically necessary ensuring safe and effective administration of radiation. Ongoing\\ntherapeutic radiology simulation-aided field setting verification is ordered throughout course of\\ntherapy.\\n\\nA high frequency ultrasound image was acquired today for two-dimensional evaluation of the tumor\\nvolume and response to treatment, in addition to geometric accuracy of field placement. US depth Is\\n1.49 mm, which is +/- 0.75 mm in difference from previous imaging. The field placement and\\nultrasound imaging, per fraction, is separate and distinct from the initial simulation, and is an\\nimportant task in providing safe administration of superficial radiation therapy. Physician evaluation of\\nthe ultrasound tumor depth will be ongoing throughout the course of treatment, and is deemed\\nmedically necessary in order to ensure the efficacy of treatment and any necessary changes. Today’s\\nimage was evaluated for determination of continuation of treatment with the current plan or with\\nnecessary changes as appropriate. According to provider review of verification therapeutic radiology\\nsimulation-aided field setting and imaging, no change is required. Additionally, the use of ultrasound visualization \\nand targeted assessment allows the patient to be able to see their cancer(s) progress, encouraging patient to\\ncomplete and maintain compliance through full course of radiotherapy.\\n\\nPer Dr. Suarez, continued ultrasound guidance and therapeutic radiology simulation-aided field setting\\nverification per fraction is required for field placement, measurement of tumor depth, progress and\\nacute effect monitoring.\\n
Detail Level: Zone
Plan: Treatment with u/s\\n\\nThis patient has been treated today with image guided superficial radiation therapy for non-melanoma\\nskin cancer.\\n\\nWritten informed consent has been previously obtained from this patient for this treatment. This\\nconsent is documented in the patient’s chart. The patient gave verbal consent to continue treatment\\ntoday.\\n\\nThe patient was treated with a specific radiation dose and setup as prescribed by the provider listed on\\nthis visit note. A Radiation Therapist performed administration of radiation under supervision of\\nprovider. The treatment parameters and cumulative dose are indicated above.\\nPrior to administering the radiation, the patient underwent a verification therapeutic radiology\\nsimulation-aided field setting defining relevant normal and abnormal target anatomy and acquiring\\nimages with high frequency ultrasound in addition to data necessary developing optimal radiation\\ntreatment process for the patient. This process includes verification of the treatment port(s) and proper\\ntreatment positioning. All treatment ports were photographed within electronic medical record. The\\npatient’s customized lead blocking along with gross tumor volume and margin was\\nconfirmed. Considering superficial radiotherapy is clinical in setup, this requires physician and\\nradiation therapist to clarify location interest being treated against initial images, pathology and\\npatient anatomy. Care was taken ensuring fields treated were geometrically accurate and properly\\npositioned using therapeutic radiology simulation-aided field setting verification per fraction. This\\nprocess is also utilized to determine if any prescription or setup changes are necessary. These steps are,\\ntherefore, medically necessary ensuring safe and effective administration of radiation. Ongoing\\ntherapeutic radiology simulation-aided field setting verification is ordered throughout course of\\ntherapy.\\n\\nA high frequency ultrasound image was acquired today for two-dimensional evaluation of the tumor\\nvolume and response to treatment, in addition to geometric accuracy of field placement. US depth Is\\n1.00 mm, which is +/- 0.06 mm in difference from previous imaging. The field placement and\\nultrasound imaging, per fraction, is separate and distinct from the initial simulation, and is an\\nimportant task in providing safe administration of superficial radiation therapy. Physician evaluation of\\nthe ultrasound tumor depth will be ongoing throughout the course of treatment, and is deemed\\nmedically necessary in order to ensure the efficacy of treatment and any necessary changes. Today’s\\nimage was evaluated for determination of continuation of treatment with the current plan or with\\nnecessary changes as appropriate. According to provider review of verification therapeutic radiology\\nsimulation-aided field setting and imaging, no change is required. Additionally, the use of ultrasound visualization \\nand targeted assessment allows the patient to be able to see their cancer(s) progress, encouraging patient to\\ncomplete and maintain compliance through full course of radiotherapy.\\n\\nPer Dr. Suarez, continued ultrasound guidance and therapeutic radiology simulation-aided field setting\\nverification per fraction is required for field placement, measurement of tumor depth, progress and\\nacute effect monitoring.\\n

## 2021-01-22 ENCOUNTER — APPOINTMENT (OUTPATIENT)
Age: 76
Setting detail: DERMATOLOGY
End: 2021-01-22

## 2021-01-22 VITALS — TEMPERATURE: 98 F

## 2021-01-22 PROBLEM — C44.311 BASAL CELL CARCINOMA OF SKIN OF NOSE: Status: ACTIVE | Noted: 2021-01-22

## 2021-01-22 PROBLEM — C44.329 SQUAMOUS CELL CARCINOMA OF SKIN OF OTHER PARTS OF FACE: Status: ACTIVE | Noted: 2021-01-22

## 2021-01-22 PROCEDURE — 77401 RADIATION TX DELIVERY SUPFC: CPT

## 2021-01-22 PROCEDURE — OTHER SUPERFICIAL RADIATION TREATMENT: OTHER

## 2021-01-22 PROCEDURE — OTHER TREATMENT REGIMEN: OTHER

## 2021-01-22 PROCEDURE — OTHER FOLLOW UP FOR NEXT VISIT: OTHER

## 2021-01-22 PROCEDURE — 77280 THER RAD SIMULAJ FIELD SMPL: CPT

## 2021-01-22 PROCEDURE — G6001 ECHO GUIDANCE RADIOTHERAPY: HCPCS

## 2021-01-22 NOTE — PROCEDURE: SUPERFICIAL RADIATION TREATMENT
Render Prescriptions In Note?: No
Depth (Optional-Please Include Units): .85 mm
Assessment: Appropriate reaction
Dose Per Fractionation In Cgy (Optional): 270.45
Functional Status: 1 (ambulatory, light activity)
Bill For Radiation Treatment: Yes
Information: Selecting Yes will display possible errors in your note based on the variables you have selected. This validation is only offered as a suggestion for you. PLEASE NOTE THAT THE VALIDATION TEXT WILL BE REMOVED WHEN YOU FINALIZE YOUR NOTE. IF YOU WANT TO FAX A PRELIMINARY NOTE YOU WILL NEED TO TOGGLE THIS TO 'NO' IF YOU DO NOT WANT IT IN YOUR FAXED NOTE.
Energy (Optional-Please Include Units): 70 KV
Custom Shielding Afterword Text Will Not Be Included With Simple Simulations (X X Y Cm............): port to correlate with the lesion size, including treatment margin. The custom lead shield is adequate to accommodate the appropriate applicator and provide adequate shielding around the treatment site. Additional shielding (as noted below) is used to protect sensitive, normal tissues.
Number Of Days Off Treatment: 1
Treatment Time / Fractionation (Optional- Include Units): 0.43 min
Total Number Of Fractions Rx 2: 15
Computed Treatment Time In Min (Will Render The Same As Calculated Treatment Time If Left Blank): 0.45 min
Port Dimensions-X Axis In Cm: 0.8
Fractions / Week: 3
Daily Fractionated Dose (Optional- Include Units): 270.45 cGy
Total Number Of Fractions: 20
Patient Positioning: Sitting
Treatment Margins In Cm: 0.35
Field Size (Applicator): 1.5 cm
Fractions / Week Rx 4: 5
Cumulative Dose In Cgy (Optional): 1083.60
Treatment Time In Min (Optional): 0.43
Fractionation Number: 4
Dose Per Fractionation In Cgy (Optional): 270.9
Treatment Device Design After Initial Simulation Justification (Will Render If Bill For Treatment Devices = Yes): The patient is status post radiation simulation and is evaluated as to the use of additional devices for shielding and placement for radiation therapy.
Field Number: 2
Field Size (Applicator): 2.5 cm
Day Of The Week Treatment Administered: Friday
Intro Statement (Will Not Render If Left Blank): The patient is undergoing superficial radiation therapy for skin cancer and presents for weekly evaluation and management.  Per protocol and as documented on the flow sheet, the patient was questioned as to subjective redness, pruritus, pain, drainage, fatigue, or any other symptoms.  Objectively, the radiation area was evaluated with regards to erythema, atrophy, scale, crusting, erosion, ulceration, edema, purpura, tenderness, warmth, drainage, and any other findings.  The plan was extensively reviewed including the dose, and dosing schedule.  The simulation and clinical setup was also reviewed as was the external and any internal shields and based on this review the appropriateness and sufficiency of treatment was determined.
Dose / Tx In Cgy (Optional): 270.90
Simple Simulation Preamble Text Will Be Included With Simple Simulations (.......... Indications): Simple simulation was performed today for the following reasons:
Simple Simulation Afterword Text Will Be Included With Simple Simulations (Indications............): The patient had a complete consultation regarding all applicable modalities for the treatment of their skin cancer and based on a variety of factors including the type of tumor, size, and location, the relevant medical history as well as local tissue factors, the functional status of the individual, the ability to perform necessary postoperative wound instructions and the need for simultaneous treatments as well as overall wound healing status, it was determined that the patient would begin radiation therapy treatment for skin cancer.  A full simulation and treatment device design was performed including the determination and formulation of appropriate simple and complex devices including lead shield of 0.762 mm thickness to form molded customized shielding to specifically correlate with the lesion size including treatment margin.  The custom lead shield is adequate to accommodate the appropriate applicator and provide adequate shielding around the treatment site.  The specific field applicator, shields, and devices both simple and complex as well as the specific patient setup is outlined below.  The patient was given a full consent for superficial radiation to both verbally and in writing and the full determination of patient's eligibility for treatment and selection is outlined on the patient eligibility and treatment selection form.  The specific superficial radiotherapy prescription was determined and was documented on the superficial radiotherapy prescription form.  A treatment calculation was also performed and documented on the treatment calculation form.  Based on the prescription, the patient was scheduled for a series of fractional treatments.
Treatment Margins In Cm: 0.5
Depth (Optional-Please Include Units): 0.97 mm
Pathology Override (Pathology Will Render As Diagnosis Name If Left Blank): BCC nodular
Custom Shielding Preamble Text Will Not Be Included With Simple Simulations (.......... X X Y Cm): A lead shield of 0.762 mm thickness is utilized to form a molded, custom shield with a
Shielding Size (Optional- Include Units): none
Total Dose (Optional-Please Include Units): 5409.0
Energy (Include Units): 70kV
Time Dose Fractionation (Optional- Include Units If Applicable): 96
Shielding Size (Optional- Include Units): 2.0 cm
Energy (Optional-Please Include Units): 70
Detail Level: Detailed
Total Dose (Optional-Please Include Units): 5418.0
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
Daily Fractionated Dose (Optional- Include Units): 270.90 cGy
Treatment Time In Min (Optional): 0.45

## 2021-01-22 NOTE — PROCEDURE: TREATMENT REGIMEN
Plan: Treatment with u/s\\n\\nThis patient has been treated today with image guided superficial radiation therapy for non-melanoma\\nskin cancer.\\n\\nWritten informed consent has been previously obtained from this patient for this treatment. This\\nconsent is documented in the patient’s chart. The patient gave verbal consent to continue treatment\\ntoday.\\n\\nThe patient was treated with a specific radiation dose and setup as prescribed by the provider listed on\\nthis visit note. A Radiation Therapist performed administration of radiation under supervision of\\nprovider. The treatment parameters and cumulative dose are indicated above.\\nPrior to administering the radiation, the patient underwent a verification therapeutic radiology\\nsimulation-aided field setting defining relevant normal and abnormal target anatomy and acquiring\\nimages with high frequency ultrasound in addition to data necessary developing optimal radiation\\ntreatment process for the patient. This process includes verification of the treatment port(s) and proper\\ntreatment positioning. All treatment ports were photographed within electronic medical record. The\\npatient’s customized lead blocking along with gross tumor volume and margin was\\nconfirmed. Considering superficial radiotherapy is clinical in setup, this requires physician and\\nradiation therapist to clarify location interest being treated against initial images, pathology and\\npatient anatomy. Care was taken ensuring fields treated were geometrically accurate and properly\\npositioned using therapeutic radiology simulation-aided field setting verification per fraction. This\\nprocess is also utilized to determine if any prescription or setup changes are necessary. These steps are,\\ntherefore, medically necessary ensuring safe and effective administration of radiation. Ongoing\\ntherapeutic radiology simulation-aided field setting verification is ordered throughout course of\\ntherapy.\\n\\nA high frequency ultrasound image was acquired today for two-dimensional evaluation of the tumor\\nvolume and response to treatment, in addition to geometric accuracy of field placement. US depth Is\\n1.31 mm, which is +/- 0.31 mm in difference from previous imaging. The field placement and\\nultrasound imaging, per fraction, is separate and distinct from the initial simulation, and is an\\nimportant task in providing safe administration of superficial radiation therapy. Physician evaluation of\\nthe ultrasound tumor depth will be ongoing throughout the course of treatment, and is deemed\\nmedically necessary in order to ensure the efficacy of treatment and any necessary changes. Today’s\\nimage was evaluated for determination of continuation of treatment with the current plan or with\\nnecessary changes as appropriate. According to provider review of verification therapeutic radiology\\nsimulation-aided field setting and imaging, no change is required. Additionally, the use of ultrasound visualization \\nand targeted assessment allows the patient to be able to see their cancer(s) progress, encouraging patient to\\ncomplete and maintain compliance through full course of radiotherapy.\\n\\nPer Dr. Suarez, continued ultrasound guidance and therapeutic radiology simulation-aided field setting\\nverification per fraction is required for field placement, measurement of tumor depth, progress and\\nacute effect monitoring.\\n
Plan: Treatment with u/s\\n\\nThis patient has been treated today with image guided superficial radiation therapy for non-melanoma\\nskin cancer.\\n\\nWritten informed consent has been previously obtained from this patient for this treatment. This\\nconsent is documented in the patient’s chart. The patient gave verbal consent to continue treatment\\ntoday.\\n\\nThe patient was treated with a specific radiation dose and setup as prescribed by the provider listed on\\nthis visit note. A Radiation Therapist performed administration of radiation under supervision of\\nprovider. The treatment parameters and cumulative dose are indicated above.\\nPrior to administering the radiation, the patient underwent a verification therapeutic radiology\\nsimulation-aided field setting defining relevant normal and abnormal target anatomy and acquiring\\nimages with high frequency ultrasound in addition to data necessary developing optimal radiation\\ntreatment process for the patient. This process includes verification of the treatment port(s) and proper\\ntreatment positioning. All treatment ports were photographed within electronic medical record. The\\npatient’s customized lead blocking along with gross tumor volume and margin was\\nconfirmed. Considering superficial radiotherapy is clinical in setup, this requires physician and\\nradiation therapist to clarify location interest being treated against initial images, pathology and\\npatient anatomy. Care was taken ensuring fields treated were geometrically accurate and properly\\npositioned using therapeutic radiology simulation-aided field setting verification per fraction. This\\nprocess is also utilized to determine if any prescription or setup changes are necessary. These steps are,\\ntherefore, medically necessary ensuring safe and effective administration of radiation. Ongoing\\ntherapeutic radiology simulation-aided field setting verification is ordered throughout course of\\ntherapy.\\n\\nA high frequency ultrasound image was acquired today for two-dimensional evaluation of the tumor\\nvolume and response to treatment, in addition to geometric accuracy of field placement. US depth Is\\n1.01 mm, which is +/- 0.48 mm in difference from previous imaging. The field placement and\\nultrasound imaging, per fraction, is separate and distinct from the initial simulation, and is an\\nimportant task in providing safe administration of superficial radiation therapy. Physician evaluation of\\nthe ultrasound tumor depth will be ongoing throughout the course of treatment, and is deemed\\nmedically necessary in order to ensure the efficacy of treatment and any necessary changes. Today’s\\nimage was evaluated for determination of continuation of treatment with the current plan or with\\nnecessary changes as appropriate. According to provider review of verification therapeutic radiology\\nsimulation-aided field setting and imaging, no change is required. Additionally, the use of ultrasound visualization \\nand targeted assessment allows the patient to be able to see their cancer(s) progress, encouraging patient to\\ncomplete and maintain compliance through full course of radiotherapy.\\n\\nPer Dr. Suarez, continued ultrasound guidance and therapeutic radiology simulation-aided field setting\\nverification per fraction is required for field placement, measurement of tumor depth, progress and\\nacute effect monitoring.\\n
Detail Level: Zone

## 2021-01-26 ENCOUNTER — APPOINTMENT (OUTPATIENT)
Age: 76
Setting detail: DERMATOLOGY
End: 2021-02-08

## 2021-01-26 ENCOUNTER — APPOINTMENT (OUTPATIENT)
Age: 76
Setting detail: DERMATOLOGY
End: 2021-01-28

## 2021-01-26 PROBLEM — C44.311 BASAL CELL CARCINOMA OF SKIN OF NOSE: Status: ACTIVE | Noted: 2021-01-26

## 2021-01-26 PROBLEM — C44.329 SQUAMOUS CELL CARCINOMA OF SKIN OF OTHER PARTS OF FACE: Status: ACTIVE | Noted: 2021-01-26

## 2021-01-26 PROCEDURE — OTHER FOLLOW UP FOR NEXT VISIT: OTHER

## 2021-01-26 PROCEDURE — 77401 RADIATION TX DELIVERY SUPFC: CPT

## 2021-01-26 PROCEDURE — 77427 RADIATION TX MANAGEMENT X5: CPT

## 2021-01-26 PROCEDURE — 77280 THER RAD SIMULAJ FIELD SMPL: CPT

## 2021-01-26 PROCEDURE — 77336 RADIATION PHYSICS CONSULT: CPT

## 2021-01-26 PROCEDURE — OTHER SUPERFICIAL RADIATION TREATMENT: OTHER

## 2021-01-26 PROCEDURE — G6001 ECHO GUIDANCE RADIOTHERAPY: HCPCS

## 2021-01-26 PROCEDURE — OTHER TREATMENT REGIMEN: OTHER

## 2021-01-26 NOTE — PROCEDURE: SUPERFICIAL RADIATION TREATMENT
Fractions / Week Rx 4: 5
Bill For Dosimetry/Render Decay And Dose Adjustment Calculation In Note: No
Include Rx 3 When Rendering Additional Prescriptions: Yes
Dimensions-Y Axis In Cm: 1
Field Size (Applicator): 2.0 cm
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
Comments: On Target, RTOG 1
Energy (Optional-Please Include Units): 70
Dose / Tx In Cgy (Optional): 270.45
Treatment Time / Fractionation (Optional- Include Units): 0.45 min
Detail Level: Detailed
Depth (Optional-Please Include Units): 0.97 mm
Simple Simulation Preamble Text Will Be Included With Simple Simulations (.......... Indications): Simple simulation was performed today for the following reasons:
Total Number Of Fractions Rx 2: 15
Total Dose (Optional-Please Include Units): 5409.0
Depth (Optional-Please Include Units): .85 mm
Treatment Time In Min (Optional): 0.43
Simple Simulation Afterword Text Will Be Included With Simple Simulations (Indications............): The patient had a complete consultation regarding all applicable modalities for the treatment of their skin cancer and based on a variety of factors including the type of tumor, size, and location, the relevant medical history as well as local tissue factors, the functional status of the individual, the ability to perform necessary postoperative wound instructions and the need for simultaneous treatments as well as overall wound healing status, it was determined that the patient would begin radiation therapy treatment for skin cancer.  A full simulation and treatment device design was performed including the determination and formulation of appropriate simple and complex devices including lead shield of 0.762 mm thickness to form molded customized shielding to specifically correlate with the lesion size including treatment margin.  The custom lead shield is adequate to accommodate the appropriate applicator and provide adequate shielding around the treatment site.  The specific field applicator, shields, and devices both simple and complex as well as the specific patient setup is outlined below.  The patient was given a full consent for superficial radiation to both verbally and in writing and the full determination of patient's eligibility for treatment and selection is outlined on the patient eligibility and treatment selection form.  The specific superficial radiotherapy prescription was determined and was documented on the superficial radiotherapy prescription form.  A treatment calculation was also performed and documented on the treatment calculation form.  Based on the prescription, the patient was scheduled for a series of fractional treatments.
Port Dimensions-X Axis In Cm: 2
Treatment Device Design After Initial Simulation Justification (Will Render If Bill For Treatment Devices = Yes): The patient is status post radiation simulation and is evaluated as to the use of additional devices for shielding and placement for radiation therapy.
Field Size (Applicator): 2.5 cm
Intro Statement (Will Not Render If Left Blank): The patient is undergoing superficial radiation therapy for skin cancer and presents for weekly evaluation and management.  Per protocol and as documented on the flow sheet, the patient was questioned as to subjective redness, pruritus, pain, drainage, fatigue, or any other symptoms.  Objectively, the radiation area was evaluated with regards to erythema, atrophy, scale, crusting, erosion, ulceration, edema, purpura, tenderness, warmth, drainage, and any other findings.  The plan was extensively reviewed including the dose, and dosing schedule.  The simulation and clinical setup was also reviewed as was the external and any internal shields and based on this review the appropriateness and sufficiency of treatment was determined.
Assessment: Appropriate reaction
Total Dose (Optional-Please Include Units): 5418.0
Field Size (Applicator): 1.5 cm
Treatment Time / Fractionation (Optional- Include Units): 0.43 min
Cumulative Dose In Cgy (Optional): 1352.25
Dose / Tx In Cgy (Optional): 270.90
Patient Positioning: Sitting
Day Of The Week Treatment Administered: Tuesday
Dimensions-Y Axis In Cm: 0.8
Custom Shielding Afterword Text Will Not Be Included With Simple Simulations (X X Y Cm............): port to correlate with the lesion size, including treatment margin. The custom lead shield is adequate to accommodate the appropriate applicator and provide adequate shielding around the treatment site. Additional shielding (as noted below) is used to protect sensitive, normal tissues.
Time Dose Fractionation (Optional- Include Units If Applicable): 96
Energy (Include Units): 70kV
Information: Selecting Yes will display possible errors in your note based on the variables you have selected. This validation is only offered as a suggestion for you. PLEASE NOTE THAT THE VALIDATION TEXT WILL BE REMOVED WHEN YOU FINALIZE YOUR NOTE. IF YOU WANT TO FAX A PRELIMINARY NOTE YOU WILL NEED TO TOGGLE THIS TO 'NO' IF YOU DO NOT WANT IT IN YOUR FAXED NOTE.
Energy (Optional-Please Include Units): 70 KV
Total Number Of Fractions: 20
Cumulative Dose In Cgy (Optional): 1354.50
Treatment Margins In Cm: 0.35
Treatment Margins In Cm: 0.5
Shielding Size (Optional- Include Units): none
Daily Fractionated Dose (Optional- Include Units): 270.45 cGy
Custom Shielding Preamble Text Will Not Be Included With Simple Simulations (.......... X X Y Cm): A lead shield of 0.762 mm thickness is utilized to form a molded, custom shield with a
Fractions / Week: 3
Daily Fractionated Dose (Optional- Include Units): 270.90 cGy
Treatment Time In Min (Optional): 0.45
Functional Status: 1 (ambulatory, light activity)
Dose Per Fractionation In Cgy (Optional): 270.9
Pathology Override (Pathology Will Render As Diagnosis Name If Left Blank): BCC nodular

## 2021-01-26 NOTE — PROCEDURE: TREATMENT REGIMEN
Plan: Treatment with u/s\\n\\nThis patient has been treated today with image guided superficial radiation therapy for non-melanoma\\nskin cancer.\\n\\nWritten informed consent has been previously obtained from this patient for this treatment. This\\nconsent is documented in the patient’s chart. The patient gave verbal consent to continue treatment\\ntoday.\\n\\nThe patient was treated with a specific radiation dose and setup as prescribed by the provider listed on\\nthis visit note. A Radiation Therapist performed administration of radiation under supervision of\\nprovider. The treatment parameters and cumulative dose are indicated above.\\nPrior to administering the radiation, the patient underwent a verification therapeutic radiology\\nsimulation-aided field setting defining relevant normal and abnormal target anatomy and acquiring\\nimages with high frequency ultrasound in addition to data necessary developing optimal radiation\\ntreatment process for the patient. This process includes verification of the treatment port(s) and proper\\ntreatment positioning. All treatment ports were photographed within electronic medical record. The\\npatient’s customized lead blocking along with gross tumor volume and margin was\\nconfirmed. Considering superficial radiotherapy is clinical in setup, this requires physician and\\nradiation therapist to clarify location interest being treated against initial images, pathology and\\npatient anatomy. Care was taken ensuring fields treated were geometrically accurate and properly\\npositioned using therapeutic radiology simulation-aided field setting verification per fraction. This\\nprocess is also utilized to determine if any prescription or setup changes are necessary. These steps are,\\ntherefore, medically necessary ensuring safe and effective administration of radiation. Ongoing\\ntherapeutic radiology simulation-aided field setting verification is ordered throughout course of\\ntherapy.\\n\\nA high frequency ultrasound image was acquired today for two-dimensional evaluation of the tumor\\nvolume and response to treatment, in addition to geometric accuracy of field placement. US depth Is\\n0.60 mm, which is +/- 0.71 mm in difference from previous imaging. The field placement and\\nultrasound imaging, per fraction, is separate and distinct from the initial simulation, and is an\\nimportant task in providing safe administration of superficial radiation therapy. Physician evaluation of\\nthe ultrasound tumor depth will be ongoing throughout the course of treatment, and is deemed\\nmedically necessary in order to ensure the efficacy of treatment and any necessary changes. Today’s\\nimage was evaluated for determination of continuation of treatment with the current plan or with\\nnecessary changes as appropriate. According to provider review of verification therapeutic radiology\\nsimulation-aided field setting and imaging, no change is required. Additionally, the use of ultrasound visualization \\nand targeted assessment allows the patient to be able to see their cancer(s) progress, encouraging patient to\\ncomplete and maintain compliance through full course of radiotherapy.\\n\\nPer Dr. Suarez, continued ultrasound guidance and therapeutic radiology simulation-aided field setting\\nverification per fraction is required for field placement, measurement of tumor depth, progress and\\nacute effect monitoring.\\n
Plan: Treatment with u/s\\n\\nThis patient has been treated today with image guided superficial radiation therapy for non-melanoma\\nskin cancer.\\n\\nWritten informed consent has been previously obtained from this patient for this treatment. This\\nconsent is documented in the patient’s chart. The patient gave verbal consent to continue treatment\\ntoday.\\n\\nThe patient was treated with a specific radiation dose and setup as prescribed by the provider listed on\\nthis visit note. A Radiation Therapist performed administration of radiation under supervision of\\nprovider. The treatment parameters and cumulative dose are indicated above.\\nPrior to administering the radiation, the patient underwent a verification therapeutic radiology\\nsimulation-aided field setting defining relevant normal and abnormal target anatomy and acquiring\\nimages with high frequency ultrasound in addition to data necessary developing optimal radiation\\ntreatment process for the patient. This process includes verification of the treatment port(s) and proper\\ntreatment positioning. All treatment ports were photographed within electronic medical record. The\\npatient’s customized lead blocking along with gross tumor volume and margin was\\nconfirmed. Considering superficial radiotherapy is clinical in setup, this requires physician and\\nradiation therapist to clarify location interest being treated against initial images, pathology and\\npatient anatomy. Care was taken ensuring fields treated were geometrically accurate and properly\\npositioned using therapeutic radiology simulation-aided field setting verification per fraction. This\\nprocess is also utilized to determine if any prescription or setup changes are necessary. These steps are,\\ntherefore, medically necessary ensuring safe and effective administration of radiation. Ongoing\\ntherapeutic radiology simulation-aided field setting verification is ordered throughout course of\\ntherapy.\\n\\nA high frequency ultrasound image was acquired today for two-dimensional evaluation of the tumor\\nvolume and response to treatment, in addition to geometric accuracy of field placement. US depth Is\\n1.01 mm, which is +/- 0.48 mm in difference from previous imaging. The field placement and\\nultrasound imaging, per fraction, is separate and distinct from the initial simulation, and is an\\nimportant task in providing safe administration of superficial radiation therapy. Physician evaluation of\\nthe ultrasound tumor depth will be ongoing throughout the course of treatment, and is deemed\\nmedically necessary in order to ensure the efficacy of treatment and any necessary changes. Today’s\\nimage was evaluated for determination of continuation of treatment with the current plan or with\\nnecessary changes as appropriate. According to provider review of verification therapeutic radiology\\nsimulation-aided field setting and imaging, no change is required. Additionally, the use of ultrasound visualization \\nand targeted assessment allows the patient to be able to see their cancer(s) progress, encouraging patient to\\ncomplete and maintain compliance through full course of radiotherapy.\\n\\nPer Dr. Suarez, continued ultrasound guidance and therapeutic radiology simulation-aided field setting\\nverification per fraction is required for field placement, measurement of tumor depth, progress and\\nacute effect monitoring.\\n
Detail Level: Zone

## 2021-01-28 ENCOUNTER — APPOINTMENT (OUTPATIENT)
Age: 76
Setting detail: DERMATOLOGY
End: 2021-01-28

## 2021-01-28 ENCOUNTER — APPOINTMENT (OUTPATIENT)
Age: 76
Setting detail: DERMATOLOGY
End: 2021-01-29

## 2021-01-28 VITALS — TEMPERATURE: 98 F

## 2021-01-28 VITALS — TEMPERATURE: 98.1 F

## 2021-01-28 DIAGNOSIS — D485 NEOPLASM OF UNCERTAIN BEHAVIOR OF SKIN: ICD-10-CM

## 2021-01-28 PROBLEM — D48.5 NEOPLASM OF UNCERTAIN BEHAVIOR OF SKIN: Status: ACTIVE | Noted: 2021-01-28

## 2021-01-28 PROBLEM — C44.311 BASAL CELL CARCINOMA OF SKIN OF NOSE: Status: ACTIVE | Noted: 2021-01-28

## 2021-01-28 PROBLEM — C44.329 SQUAMOUS CELL CARCINOMA OF SKIN OF OTHER PARTS OF FACE: Status: ACTIVE | Noted: 2021-01-28

## 2021-01-28 PROCEDURE — OTHER BIOPSY BY SHAVE METHOD: OTHER

## 2021-01-28 PROCEDURE — 77280 THER RAD SIMULAJ FIELD SMPL: CPT

## 2021-01-28 PROCEDURE — 77401 RADIATION TX DELIVERY SUPFC: CPT

## 2021-01-28 PROCEDURE — OTHER FOLLOW UP FOR NEXT VISIT: OTHER

## 2021-01-28 PROCEDURE — G6001 ECHO GUIDANCE RADIOTHERAPY: HCPCS

## 2021-01-28 PROCEDURE — OTHER TREATMENT REGIMEN: OTHER

## 2021-01-28 PROCEDURE — OTHER SUPERFICIAL RADIATION TREATMENT: OTHER

## 2021-01-28 PROCEDURE — 11102 TANGNTL BX SKIN SINGLE LES: CPT

## 2021-01-28 ASSESSMENT — LOCATION SIMPLE DESCRIPTION DERM: LOCATION SIMPLE: RIGHT UPPER BACK

## 2021-01-28 ASSESSMENT — LOCATION DETAILED DESCRIPTION DERM: LOCATION DETAILED: RIGHT MID-UPPER BACK

## 2021-01-28 ASSESSMENT — LOCATION ZONE DERM: LOCATION ZONE: TRUNK

## 2021-01-28 NOTE — PROCEDURE: SUPERFICIAL RADIATION TREATMENT
Number Of Treatment Days: 1
Detail Level: Detailed
Custom Shielding Afterword Text Will Not Be Included With Simple Simulations (X X Y Cm............): port to correlate with the lesion size, including treatment margin. The custom lead shield is adequate to accommodate the appropriate applicator and provide adequate shielding around the treatment site. Additional shielding (as noted below) is used to protect sensitive, normal tissues.
Treatment Time In Min (Optional): 0.45
Fractions / Week: 3
Include Rx 3 When Rendering Additional Prescriptions: Yes
Initial Radiation Treatment Planning (Will Render If Bill Simulation = Yes): The patient had a complete consultation regarding all applicable modalities for the treatment of their skin cancer and based on a variety of factors including the type of tumor, size, and location, the relevant medical history as well as local tissue factors, the functional status of the individual, the ability to perform necessary postoperative wound instructions and the need for simultaneous treatments as well as overall wound healing status, it was determined that the patient would begin radiation therapy treatment for skin cancer.  A full simulation and treatment device design was performed including the determination and formulation of appropriate simple and complex devices including lead shield of 0.762 mm thickness to form molded customized shielding to specifically correlate with the lesion size including treatment margin.  The custom lead shield is adequate to accommodate the appropriate applicator and provide adequate shielding around the treatment site.  The specific field applicator, shields, and devices both simple and complex as well as the specific patient setup is outlined below.  The patient was given a full consent for superficial radiation to both verbally and in writing and the full determination of patient's eligibility for treatment and selection is outlined on the patient eligibility and treatment selection form.  The specific superficial radiotherapy prescription was determined and was documented on the superficial radiotherapy prescription form.  A treatment calculation was also performed and documented on the treatment calculation form.  Based on the prescription, the patient was scheduled for a series of fractional treatments.
Port Dimensions-Y Axis In Cm: 2
Render Additional Prescriptions In Note?: No
Computed Treatment Time In Min (Will Render The Same As Calculated Treatment Time If Left Blank): 0.43 min
Daily Fractionated Dose (Optional- Include Units): 270.90 cGy
Assessment: Appropriate reaction
Total Dose (Optional-Please Include Units): 5409.0
Total Number Of Fractions Rx 3: 15
Fractionation Number (Evaluation): 5
Patient Positioning: Sitting
Treatment Time In Min (Optional): 0.43
Comments: On Target, RTOG 1
Treatment Margins In Cm: 0.35
Intro Statement (Will Not Render If Left Blank): The patient is undergoing superficial radiation therapy for skin cancer and presents for weekly evaluation and management.  Per protocol and as documented on the flow sheet, the patient was questioned as to subjective redness, pruritus, pain, drainage, fatigue, or any other symptoms.  Objectively, the radiation area was evaluated with regards to erythema, atrophy, scale, crusting, erosion, ulceration, edema, purpura, tenderness, warmth, drainage, and any other findings.  The plan was extensively reviewed including the dose, and dosing schedule.  The simulation and clinical setup was also reviewed as was the external and any internal shields and based on this review the appropriateness and sufficiency of treatment was determined.
Total Dose (Optional-Please Include Units): 5418.0
Energy (Optional-Please Include Units): 70 KV
Treatment Device Design After Initial Simulation Justification (Will Render If Bill For Treatment Devices = Yes): The patient is status post radiation simulation and is evaluated as to the use of additional devices for shielding and placement for radiation therapy.
Dimensions-X Axis In Cm: 0.8
Simple Simulation Preamble Text Will Be Included With Simple Simulations (.......... Indications): Simple simulation was performed today for the following reasons:
Field Size (Applicator): 2.5 cm
Energy (Include Units): 70kV
Information: Selecting Yes will display possible errors in your note based on the variables you have selected. This validation is only offered as a suggestion for you. PLEASE NOTE THAT THE VALIDATION TEXT WILL BE REMOVED WHEN YOU FINALIZE YOUR NOTE. IF YOU WANT TO FAX A PRELIMINARY NOTE YOU WILL NEED TO TOGGLE THIS TO 'NO' IF YOU DO NOT WANT IT IN YOUR FAXED NOTE.
Dose / Tx In Cgy (Optional): 270.90
Functional Status: 1 (ambulatory, light activity)
Depth (Optional-Please Include Units): 0.97 mm
Shielding Size (Optional- Include Units): none
Fractionation Number: 6
Daily Fractionated Dose (Optional- Include Units): 270.45 cGy
Total Number Of Fractions: 20
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
Shielding Size (Optional- Include Units): 2.0 cm
Field Size (Applicator): 1.5 cm
Custom Shielding Preamble Text Will Not Be Included With Simple Simulations (.......... X X Y Cm): A lead shield of 0.762 mm thickness is utilized to form a molded, custom shield with a
Computed Treatment Time In Min (Will Render The Same As Calculated Treatment Time If Left Blank): 0.45 min
Dose Per Fractionation In Cgy (Optional): 270.9
Energy (Optional-Please Include Units): 70
Day Of The Week Treatment Administered: Tuesday
Dose / Tx In Cgy (Optional): 270.45
Pathology Override (Pathology Will Render As Diagnosis Name If Left Blank): BCC nodular
Depth (Optional-Please Include Units): .85 mm
Treatment Margins In Cm: 0.5
Time Dose Fractionation (Optional- Include Units If Applicable): 96
Day Of The Week Treatment Administered: Thursday
Cumulative Dose In Cgy (Optional): 1622.70
Cumulative Dose In Cgy (Optional): 1625.40

## 2021-01-28 NOTE — PROCEDURE: TREATMENT REGIMEN
Plan: Treatment with u/s\\n\\nThis patient has been treated today with image guided superficial radiation therapy for non-melanoma\\nskin cancer.\\n\\nWritten informed consent has been previously obtained from this patient for this treatment. This\\nconsent is documented in the patient’s chart. The patient gave verbal consent to continue treatment\\ntoday.\\n\\nThe patient was treated with a specific radiation dose and setup as prescribed by the provider listed on\\nthis visit note. A Radiation Therapist performed administration of radiation under supervision of\\nprovider. The treatment parameters and cumulative dose are indicated above.\\nPrior to administering the radiation, the patient underwent a verification therapeutic radiology\\nsimulation-aided field setting defining relevant normal and abnormal target anatomy and acquiring\\nimages with high frequency ultrasound in addition to data necessary developing optimal radiation\\ntreatment process for the patient. This process includes verification of the treatment port(s) and proper\\ntreatment positioning. All treatment ports were photographed within electronic medical record. The\\npatient’s customized lead blocking along with gross tumor volume and margin was\\nconfirmed. Considering superficial radiotherapy is clinical in setup, this requires physician and\\nradiation therapist to clarify location interest being treated against initial images, pathology and\\npatient anatomy. Care was taken ensuring fields treated were geometrically accurate and properly\\npositioned using therapeutic radiology simulation-aided field setting verification per fraction. This\\nprocess is also utilized to determine if any prescription or setup changes are necessary. These steps are,\\ntherefore, medically necessary ensuring safe and effective administration of radiation. Ongoing\\ntherapeutic radiology simulation-aided field setting verification is ordered throughout course of\\ntherapy.\\n\\nA high frequency ultrasound image was acquired today for two-dimensional evaluation of the tumor\\nvolume and response to treatment, in addition to geometric accuracy of field placement. US depth Is\\n1.35 mm, which is +/- 0.75 mm in difference from previous imaging. The field placement and\\nultrasound imaging, per fraction, is separate and distinct from the initial simulation, and is an\\nimportant task in providing safe administration of superficial radiation therapy. Physician evaluation of\\nthe ultrasound tumor depth will be ongoing throughout the course of treatment, and is deemed\\nmedically necessary in order to ensure the efficacy of treatment and any necessary changes. Today’s\\nimage was evaluated for determination of continuation of treatment with the current plan or with\\nnecessary changes as appropriate. According to provider review of verification therapeutic radiology\\nsimulation-aided field setting and imaging, no change is required. Additionally, the use of ultrasound visualization \\nand targeted assessment allows the patient to be able to see their cancer(s) progress, encouraging patient to\\ncomplete and maintain compliance through full course of radiotherapy.\\n\\nPer Dr. Suarez, continued ultrasound guidance and therapeutic radiology simulation-aided field setting\\nverification per fraction is required for field placement, measurement of tumor depth, progress and\\nacute effect monitoring.\\n
Plan: Treatment with u/s\\n\\nThis patient has been treated today with image guided superficial radiation therapy for non-melanoma\\nskin cancer.\\n\\nWritten informed consent has been previously obtained from this patient for this treatment. This\\nconsent is documented in the patient’s chart. The patient gave verbal consent to continue treatment\\ntoday.\\n\\nThe patient was treated with a specific radiation dose and setup as prescribed by the provider listed on\\nthis visit note. A Radiation Therapist performed administration of radiation under supervision of\\nprovider. The treatment parameters and cumulative dose are indicated above.\\nPrior to administering the radiation, the patient underwent a verification therapeutic radiology\\nsimulation-aided field setting defining relevant normal and abnormal target anatomy and acquiring\\nimages with high frequency ultrasound in addition to data necessary developing optimal radiation\\ntreatment process for the patient. This process includes verification of the treatment port(s) and proper\\ntreatment positioning. All treatment ports were photographed within electronic medical record. The\\npatient’s customized lead blocking along with gross tumor volume and margin was\\nconfirmed. Considering superficial radiotherapy is clinical in setup, this requires physician and\\nradiation therapist to clarify location interest being treated against initial images, pathology and\\npatient anatomy. Care was taken ensuring fields treated were geometrically accurate and properly\\npositioned using therapeutic radiology simulation-aided field setting verification per fraction. This\\nprocess is also utilized to determine if any prescription or setup changes are necessary. These steps are,\\ntherefore, medically necessary ensuring safe and effective administration of radiation. Ongoing\\ntherapeutic radiology simulation-aided field setting verification is ordered throughout course of\\ntherapy.\\n\\nA high frequency ultrasound image was acquired today for two-dimensional evaluation of the tumor\\nvolume and response to treatment, in addition to geometric accuracy of field placement. US depth Is\\n0.88 mm, which is +/- 0.08 mm in difference from previous imaging. The field placement and\\nultrasound imaging, per fraction, is separate and distinct from the initial simulation, and is an\\nimportant task in providing safe administration of superficial radiation therapy. Physician evaluation of\\nthe ultrasound tumor depth will be ongoing throughout the course of treatment, and is deemed\\nmedically necessary in order to ensure the efficacy of treatment and any necessary changes. Today’s\\nimage was evaluated for determination of continuation of treatment with the current plan or with\\nnecessary changes as appropriate. According to provider review of verification therapeutic radiology\\nsimulation-aided field setting and imaging, no change is required. Additionally, the use of ultrasound visualization \\nand targeted assessment allows the patient to be able to see their cancer(s) progress, encouraging patient to\\ncomplete and maintain compliance through full course of radiotherapy.\\n\\nPer Dr. Suarez, continued ultrasound guidance and therapeutic radiology simulation-aided field setting\\nverification per fraction is required for field placement, measurement of tumor depth, progress and\\nacute effect monitoring.\\n
Detail Level: Zone

## 2021-01-28 NOTE — PROCEDURE: BIOPSY BY SHAVE METHOD
Silver Nitrate Text: The wound bed was treated with silver nitrate after the biopsy was performed.
Notification Instructions: Patient will be notified of biopsy results. However, patient instructed to call the office if not contacted within 2 weeks.
Hemostasis: Electrocautery
Render In Bullet Format When Appropriate: No
Wound Care: Vaseline
Type Of Destruction Used: Curettage
Post-Care Instructions: I reviewed with the patient in detail post-care instructions. Patient is to keep the biopsy site dry overnight, and then apply bacitracin twice daily until healed. Patient may apply hydrogen peroxide soaks to remove any crusting.
Electrodesiccation Text: The wound bed was treated with electrodesiccation after the biopsy was performed.
Billing Type: Third-Party Bill
Consent: Written consent was obtained and risks were reviewed including but not limited to scarring, infection, bleeding, scabbing, incomplete removal, nerve damage and allergy to anesthesia.  Clinical evaluation reveals changes suspicious for rule out provided in pathology requisition.  Intent of procedure is to obtain tissue sample for histopathic examination.  A skin biopsy is considered a necessary and appropriate to clarify the diagnosis.
Size Of Lesion In Cm: 0.8
Anesthesia Type: 1% lidocaine with epinephrine
Anesthesia Volume In Cc: 0.5
Was A Bandage Applied: Yes
Curettage Text: The wound bed was treated with curettage after the biopsy was performed.
Cryotherapy Text: The wound bed was treated with cryotherapy after the biopsy was performed.
Electrodesiccation And Curettage Text: The wound bed was treated with electrodesiccation and curettage after the biopsy was performed.
Depth Of Biopsy: dermis
Information: Selecting Yes will display possible errors in your note based on the variables you have selected. This validation is only offered as a suggestion for you. PLEASE NOTE THAT THE VALIDATION TEXT WILL BE REMOVED WHEN YOU FINALIZE YOUR NOTE. IF YOU WANT TO FAX A PRELIMINARY NOTE YOU WILL NEED TO TOGGLE THIS TO 'NO' IF YOU DO NOT WANT IT IN YOUR FAXED NOTE.
Detail Level: Detailed
Additional Anesthesia Volume In Cc (Will Not Render If 0): 0
Dressing: bandage
Biopsy Method: 10 blade
Biopsy Type: H and E

## 2021-02-02 ENCOUNTER — APPOINTMENT (OUTPATIENT)
Age: 76
Setting detail: DERMATOLOGY
End: 2021-02-05

## 2021-02-02 VITALS — TEMPERATURE: 79.9 F

## 2021-02-02 PROBLEM — C44.329 SQUAMOUS CELL CARCINOMA OF SKIN OF OTHER PARTS OF FACE: Status: ACTIVE | Noted: 2021-02-02

## 2021-02-02 PROBLEM — C44.311 BASAL CELL CARCINOMA OF SKIN OF NOSE: Status: ACTIVE | Noted: 2021-02-02

## 2021-02-02 PROCEDURE — 77280 THER RAD SIMULAJ FIELD SMPL: CPT

## 2021-02-02 PROCEDURE — OTHER FOLLOW UP FOR NEXT VISIT: OTHER

## 2021-02-02 PROCEDURE — OTHER TREATMENT REGIMEN: OTHER

## 2021-02-02 PROCEDURE — G6001 ECHO GUIDANCE RADIOTHERAPY: HCPCS

## 2021-02-02 PROCEDURE — OTHER SUPERFICIAL RADIATION TREATMENT: OTHER

## 2021-02-02 PROCEDURE — 77401 RADIATION TX DELIVERY SUPFC: CPT

## 2021-02-02 NOTE — PROCEDURE: TREATMENT REGIMEN
Detail Level: Zone
Plan: Treatment with u/s\\n\\nThis patient has been treated today with image guided superficial radiation therapy for non-melanoma\\nskin cancer.\\n\\nWritten informed consent has been previously obtained from this patient for this treatment. This\\nconsent is documented in the patient’s chart. The patient gave verbal consent to continue treatment\\ntoday.\\n\\nThe patient was treated with a specific radiation dose and setup as prescribed by the provider listed on\\nthis visit note. A Radiation Therapist performed administration of radiation under supervision of\\nprovider. The treatment parameters and cumulative dose are indicated above.\\nPrior to administering the radiation, the patient underwent a verification therapeutic radiology\\nsimulation-aided field setting defining relevant normal and abnormal target anatomy and acquiring\\nimages with high frequency ultrasound in addition to data necessary developing optimal radiation\\ntreatment process for the patient. This process includes verification of the treatment port(s) and proper\\ntreatment positioning. All treatment ports were photographed within electronic medical record. The\\npatient’s customized lead blocking along with gross tumor volume and margin was\\nconfirmed. Considering superficial radiotherapy is clinical in setup, this requires physician and\\nradiation therapist to clarify location interest being treated against initial images, pathology and\\npatient anatomy. Care was taken ensuring fields treated were geometrically accurate and properly\\npositioned using therapeutic radiology simulation-aided field setting verification per fraction. This\\nprocess is also utilized to determine if any prescription or setup changes are necessary. These steps are,\\ntherefore, medically necessary ensuring safe and effective administration of radiation. Ongoing\\ntherapeutic radiology simulation-aided field setting verification is ordered throughout course of\\ntherapy.\\n\\nA high frequency ultrasound image was acquired today for two-dimensional evaluation of the tumor\\nvolume and response to treatment, in addition to geometric accuracy of field placement. US depth Is\\n1.24 mm, which is +/- 0.11 mm in difference from previous imaging. The field placement and\\nultrasound imaging, per fraction, is separate and distinct from the initial simulation, and is an\\nimportant task in providing safe administration of superficial radiation therapy. Physician evaluation of\\nthe ultrasound tumor depth will be ongoing throughout the course of treatment, and is deemed\\nmedically necessary in order to ensure the efficacy of treatment and any necessary changes. Today’s\\nimage was evaluated for determination of continuation of treatment with the current plan or with\\nnecessary changes as appropriate. According to provider review of verification therapeutic radiology\\nsimulation-aided field setting and imaging, no change is required. Additionally, the use of ultrasound visualization \\nand targeted assessment allows the patient to be able to see their cancer(s) progress, encouraging patient to\\ncomplete and maintain compliance through full course of radiotherapy.\\n\\nPer Dr. Suarez, continued ultrasound guidance and therapeutic radiology simulation-aided field setting\\nverification per fraction is required for field placement, measurement of tumor depth, progress and\\nacute effect monitoring.\\n

## 2021-02-02 NOTE — PROCEDURE: SUPERFICIAL RADIATION TREATMENT
Treatment Time / Fractionation (Optional- Include Units): 0.43 min
Render Prescriptions In Note?: No
Fractions / Week Rx 4: 5
Comments: On Target, RTOG 1
Total Dose (Optional-Please Include Units): 5409.0
Treatment Time In Min (Optional): 0.43
Field Size (Applicator): 2.5 cm
Custom Shielding Preamble Text Will Not Be Included With Simple Simulations (.......... X X Y Cm): A lead shield of 0.762 mm thickness is utilized to form a molded, custom shield with a
Prescription Used: 1
Functional Status: 1 (ambulatory, light activity)
Energy (Optional-Please Include Units): 70 KV
Dose Per Fractionation In Cgy (Optional): 270.9
Simple Simulation Afterword Text Will Be Included With Simple Simulations (Indications............): The patient had a complete consultation regarding all applicable modalities for the treatment of their skin cancer and based on a variety of factors including the type of tumor, size, and location, the relevant medical history as well as local tissue factors, the functional status of the individual, the ability to perform necessary postoperative wound instructions and the need for simultaneous treatments as well as overall wound healing status, it was determined that the patient would begin radiation therapy treatment for skin cancer.  A full simulation and treatment device design was performed including the determination and formulation of appropriate simple and complex devices including lead shield of 0.762 mm thickness to form molded customized shielding to specifically correlate with the lesion size including treatment margin.  The custom lead shield is adequate to accommodate the appropriate applicator and provide adequate shielding around the treatment site.  The specific field applicator, shields, and devices both simple and complex as well as the specific patient setup is outlined below.  The patient was given a full consent for superficial radiation to both verbally and in writing and the full determination of patient's eligibility for treatment and selection is outlined on the patient eligibility and treatment selection form.  The specific superficial radiotherapy prescription was determined and was documented on the superficial radiotherapy prescription form.  A treatment calculation was also performed and documented on the treatment calculation form.  Based on the prescription, the patient was scheduled for a series of fractional treatments.
Time Dose Fractionation (Optional- Include Units If Applicable): 96
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
Cumulative Dose In Cgy (Optional): 1893.15
Validate Note Data (See Information Below): Yes
Assessment: Appropriate reaction
Simple Simulation Preamble Text Will Be Included With Simple Simulations (.......... Indications): Simple simulation was performed today for the following reasons:
Energy (Optional-Please Include Units): 70
Intro Statement (Will Not Render If Left Blank): The patient is undergoing superficial radiation therapy for skin cancer and presents for weekly evaluation and management.  Per protocol and as documented on the flow sheet, the patient was questioned as to subjective redness, pruritus, pain, drainage, fatigue, or any other symptoms.  Objectively, the radiation area was evaluated with regards to erythema, atrophy, scale, crusting, erosion, ulceration, edema, purpura, tenderness, warmth, drainage, and any other findings.  The plan was extensively reviewed including the dose, and dosing schedule.  The simulation and clinical setup was also reviewed as was the external and any internal shields and based on this review the appropriateness and sufficiency of treatment was determined.
Treatment Time In Min (Optional): 0.45
Custom Shielding Afterword Text Will Not Be Included With Simple Simulations (X X Y Cm............): port to correlate with the lesion size, including treatment margin. The custom lead shield is adequate to accommodate the appropriate applicator and provide adequate shielding around the treatment site. Additional shielding (as noted below) is used to protect sensitive, normal tissues.
Computed Treatment Time In Min (Will Render The Same As Calculated Treatment Time If Left Blank): 0.45 min
Dose / Tx In Cgy (Optional): 270.90
Total Number Of Fractions Rx 2: 15
Depth (Optional-Please Include Units): 0.97 mm
Field Size (Applicator): 2.0 cm
Detail Level: Detailed
Treatment Device Design After Initial Simulation Justification (Will Render If Bill For Treatment Devices = Yes): The patient is status post radiation simulation and is evaluated as to the use of additional devices for shielding and placement for radiation therapy.
Fractionation Number: 7
Daily Fractionated Dose (Optional- Include Units): 270.45 cGy
Field Number: 2
Daily Fractionated Dose (Optional- Include Units): 270.90 cGy
Treatment Margins In Cm: 0.35
Treatment Margins In Cm: 0.5
Depth (Optional-Please Include Units): .85 mm
Shielding Size (Optional- Include Units): none
Day Of The Week Treatment Administered: Tuesday
Cumulative Dose In Cgy (Optional): 1896.30
Information: Selecting Yes will display possible errors in your note based on the variables you have selected. This validation is only offered as a suggestion for you. PLEASE NOTE THAT THE VALIDATION TEXT WILL BE REMOVED WHEN YOU FINALIZE YOUR NOTE. IF YOU WANT TO FAX A PRELIMINARY NOTE YOU WILL NEED TO TOGGLE THIS TO 'NO' IF YOU DO NOT WANT IT IN YOUR FAXED NOTE.
Dimensions-X Axis In Cm: 0.8
Fractions / Week: 3
Energy (Include Units): 70kV
Total Number Of Fractions: 20
Field Size (Applicator): 1.5 cm
Pathology Override (Pathology Will Render As Diagnosis Name If Left Blank): BCC nodular
Patient Positioning: Sitting
Dose Per Fractionation In Cgy (Optional): 270.45
Total Dose (Optional-Please Include Units): 5418.0

## 2021-02-04 ENCOUNTER — APPOINTMENT (OUTPATIENT)
Age: 76
Setting detail: DERMATOLOGY
End: 2021-02-08

## 2021-02-04 VITALS — TEMPERATURE: 79.9 F

## 2021-02-04 PROBLEM — C44.329 SQUAMOUS CELL CARCINOMA OF SKIN OF OTHER PARTS OF FACE: Status: ACTIVE | Noted: 2021-02-04

## 2021-02-04 PROBLEM — C44.311 BASAL CELL CARCINOMA OF SKIN OF NOSE: Status: ACTIVE | Noted: 2021-02-04

## 2021-02-04 PROCEDURE — OTHER TREATMENT REGIMEN: OTHER

## 2021-02-04 PROCEDURE — 77280 THER RAD SIMULAJ FIELD SMPL: CPT

## 2021-02-04 PROCEDURE — OTHER FOLLOW UP FOR NEXT VISIT: OTHER

## 2021-02-04 PROCEDURE — OTHER SUPERFICIAL RADIATION TREATMENT: OTHER

## 2021-02-04 PROCEDURE — 77401 RADIATION TX DELIVERY SUPFC: CPT

## 2021-02-04 PROCEDURE — G6001 ECHO GUIDANCE RADIOTHERAPY: HCPCS

## 2021-02-04 NOTE — PROCEDURE: TREATMENT REGIMEN
Plan: Treatment with u/s\\n\\nThis patient has been treated today with image guided superficial radiation therapy for non-melanoma\\nskin cancer.\\n\\nWritten informed consent has been previously obtained from this patient for this treatment. This\\nconsent is documented in the patient’s chart. The patient gave verbal consent to continue treatment\\ntoday.\\n\\nThe patient was treated with a specific radiation dose and setup as prescribed by the provider listed on\\nthis visit note. A Radiation Therapist performed administration of radiation under supervision of\\nprovider. The treatment parameters and cumulative dose are indicated above.\\nPrior to administering the radiation, the patient underwent a verification therapeutic radiology\\nsimulation-aided field setting defining relevant normal and abnormal target anatomy and acquiring\\nimages with high frequency ultrasound in addition to data necessary developing optimal radiation\\ntreatment process for the patient. This process includes verification of the treatment port(s) and proper\\ntreatment positioning. All treatment ports were photographed within electronic medical record. The\\npatient’s customized lead blocking along with gross tumor volume and margin was\\nconfirmed. Considering superficial radiotherapy is clinical in setup, this requires physician and\\nradiation therapist to clarify location interest being treated against initial images, pathology and\\npatient anatomy. Care was taken ensuring fields treated were geometrically accurate and properly\\npositioned using therapeutic radiology simulation-aided field setting verification per fraction. This\\nprocess is also utilized to determine if any prescription or setup changes are necessary. These steps are,\\ntherefore, medically necessary ensuring safe and effective administration of radiation. Ongoing\\ntherapeutic radiology simulation-aided field setting verification is ordered throughout course of\\ntherapy.\\n\\nA high frequency ultrasound image was acquired today for two-dimensional evaluation of the tumor\\nvolume and response to treatment, in addition to geometric accuracy of field placement. US depth Is\\n0.94 mm, which is +/- 0.30 mm in difference from previous imaging. The field placement and\\nultrasound imaging, per fraction, is separate and distinct from the initial simulation, and is an\\nimportant task in providing safe administration of superficial radiation therapy. Physician evaluation of\\nthe ultrasound tumor depth will be ongoing throughout the course of treatment, and is deemed\\nmedically necessary in order to ensure the efficacy of treatment and any necessary changes. Today’s\\nimage was evaluated for determination of continuation of treatment with the current plan or with\\nnecessary changes as appropriate. According to provider review of verification therapeutic radiology\\nsimulation-aided field setting and imaging, no change is required. Additionally, the use of ultrasound visualization \\nand targeted assessment allows the patient to be able to see their cancer(s) progress, encouraging patient to\\ncomplete and maintain compliance through full course of radiotherapy.\\n\\nPer Dr. Suarez, continued ultrasound guidance and therapeutic radiology simulation-aided field setting\\nverification per fraction is required for field placement, measurement of tumor depth, progress and\\nacute effect monitoring.\\n
Plan: Treatment with u/s\\n\\nThis patient has been treated today with image guided superficial radiation therapy for non-melanoma\\nskin cancer.\\n\\nWritten informed consent has been previously obtained from this patient for this treatment. This\\nconsent is documented in the patient’s chart. The patient gave verbal consent to continue treatment\\ntoday.\\n\\nThe patient was treated with a specific radiation dose and setup as prescribed by the provider listed on\\nthis visit note. A Radiation Therapist performed administration of radiation under supervision of\\nprovider. The treatment parameters and cumulative dose are indicated above.\\nPrior to administering the radiation, the patient underwent a verification therapeutic radiology\\nsimulation-aided field setting defining relevant normal and abnormal target anatomy and acquiring\\nimages with high frequency ultrasound in addition to data necessary developing optimal radiation\\ntreatment process for the patient. This process includes verification of the treatment port(s) and proper\\ntreatment positioning. All treatment ports were photographed within electronic medical record. The\\npatient’s customized lead blocking along with gross tumor volume and margin was\\nconfirmed. Considering superficial radiotherapy is clinical in setup, this requires physician and\\nradiation therapist to clarify location interest being treated against initial images, pathology and\\npatient anatomy. Care was taken ensuring fields treated were geometrically accurate and properly\\npositioned using therapeutic radiology simulation-aided field setting verification per fraction. This\\nprocess is also utilized to determine if any prescription or setup changes are necessary. These steps are,\\ntherefore, medically necessary ensuring safe and effective administration of radiation. Ongoing\\ntherapeutic radiology simulation-aided field setting verification is ordered throughout course of\\ntherapy.\\n\\nA high frequency ultrasound image was acquired today for two-dimensional evaluation of the tumor\\nvolume and response to treatment, in addition to geometric accuracy of field placement. US depth Is\\n1.11 mm, which is +/- 0.20 mm in difference from previous imaging. The field placement and\\nultrasound imaging, per fraction, is separate and distinct from the initial simulation, and is an\\nimportant task in providing safe administration of superficial radiation therapy. Physician evaluation of\\nthe ultrasound tumor depth will be ongoing throughout the course of treatment, and is deemed\\nmedically necessary in order to ensure the efficacy of treatment and any necessary changes. Today’s\\nimage was evaluated for determination of continuation of treatment with the current plan or with\\nnecessary changes as appropriate. According to provider review of verification therapeutic radiology\\nsimulation-aided field setting and imaging, no change is required. Additionally, the use of ultrasound visualization \\nand targeted assessment allows the patient to be able to see their cancer(s) progress, encouraging patient to\\ncomplete and maintain compliance through full course of radiotherapy.\\n\\nPer Dr. Suarez, continued ultrasound guidance and therapeutic radiology simulation-aided field setting\\nverification per fraction is required for field placement, measurement of tumor depth, progress and\\nacute effect monitoring.\\n
Detail Level: Zone

## 2021-02-04 NOTE — PROCEDURE: SUPERFICIAL RADIATION TREATMENT
Energy (Include Units): 70kV
Field Size (Applicator): 2.5 cm
Fractionation Number (Evaluation): 5
Depth (Optional-Please Include Units): .85 mm
Jarred For Simulation Without Treatment Device Design (Simple Simulation): No
Daily Fractionated Dose (Optional- Include Units): 270.45 cGy
Total Dose (Optional-Please Include Units): 5418.0
Fractions / Week: 3
Validate Note Data (See Information Below): Yes
Number Of Days Off Treatment: 1
Comments: On Target, RTOG 1
Dose / Tx In Cgy (Optional): 270.90
Dimensions-X Axis In Cm: 0.8
Dose Per Fractionation In Cgy (Optional): 270.9
Simple Simulation Afterword Text Will Be Included With Simple Simulations (Indications............): The patient had a complete consultation regarding all applicable modalities for the treatment of their skin cancer and based on a variety of factors including the type of tumor, size, and location, the relevant medical history as well as local tissue factors, the functional status of the individual, the ability to perform necessary postoperative wound instructions and the need for simultaneous treatments as well as overall wound healing status, it was determined that the patient would begin radiation therapy treatment for skin cancer.  A full simulation and treatment device design was performed including the determination and formulation of appropriate simple and complex devices including lead shield of 0.762 mm thickness to form molded customized shielding to specifically correlate with the lesion size including treatment margin.  The custom lead shield is adequate to accommodate the appropriate applicator and provide adequate shielding around the treatment site.  The specific field applicator, shields, and devices both simple and complex as well as the specific patient setup is outlined below.  The patient was given a full consent for superficial radiation to both verbally and in writing and the full determination of patient's eligibility for treatment and selection is outlined on the patient eligibility and treatment selection form.  The specific superficial radiotherapy prescription was determined and was documented on the superficial radiotherapy prescription form.  A treatment calculation was also performed and documented on the treatment calculation form.  Based on the prescription, the patient was scheduled for a series of fractional treatments.
Patient Positioning: Sitting
Day Of The Week Treatment Administered: Thursday
Simple Simulation Preamble Text Will Be Included With Simple Simulations (.......... Indications): Simple simulation was performed today for the following reasons:
Energy (Optional-Please Include Units): 70 KV
Assessment: Appropriate reaction
Custom Shielding Afterword Text Will Not Be Included With Simple Simulations (X X Y Cm............): port to correlate with the lesion size, including treatment margin. The custom lead shield is adequate to accommodate the appropriate applicator and provide adequate shielding around the treatment site. Additional shielding (as noted below) is used to protect sensitive, normal tissues.
Total Dose (Optional-Please Include Units): 5409.0
Daily Fractionated Dose (Optional- Include Units): 270.90 cGy
Intro Statement (Will Not Render If Left Blank): The patient is undergoing superficial radiation therapy for skin cancer and presents for weekly evaluation and management.  Per protocol and as documented on the flow sheet, the patient was questioned as to subjective redness, pruritus, pain, drainage, fatigue, or any other symptoms.  Objectively, the radiation area was evaluated with regards to erythema, atrophy, scale, crusting, erosion, ulceration, edema, purpura, tenderness, warmth, drainage, and any other findings.  The plan was extensively reviewed including the dose, and dosing schedule.  The simulation and clinical setup was also reviewed as was the external and any internal shields and based on this review the appropriateness and sufficiency of treatment was determined.
Treatment Time / Fractionation (Optional- Include Units): 0.45 min
Detail Level: Detailed
Total Number Of Fractions Rx 2: 15
Treatment Time / Fractionation (Optional- Include Units): 0.43 min
Field Size (Applicator): 1.5 cm
Shielding Size (Optional- Include Units): none
Treatment Time In Min (Optional): 0.45
Fractionation Number: 8
Dose Per Fractionation In Cgy (Optional): 270.45
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
Port Dimensions-X Axis In Cm: 2
Field Size (Applicator): 2.0 cm
Total Number Of Fractions: 20
Depth (Optional-Please Include Units): 0.97 mm
Custom Shielding Preamble Text Will Not Be Included With Simple Simulations (.......... X X Y Cm): A lead shield of 0.762 mm thickness is utilized to form a molded, custom shield with a
Information: Selecting Yes will display possible errors in your note based on the variables you have selected. This validation is only offered as a suggestion for you. PLEASE NOTE THAT THE VALIDATION TEXT WILL BE REMOVED WHEN YOU FINALIZE YOUR NOTE. IF YOU WANT TO FAX A PRELIMINARY NOTE YOU WILL NEED TO TOGGLE THIS TO 'NO' IF YOU DO NOT WANT IT IN YOUR FAXED NOTE.
Cumulative Dose In Cgy (Optional): 2167.2
Time Dose Fractionation (Optional- Include Units If Applicable): 96
Treatment Device Design After Initial Simulation Justification (Will Render If Bill For Treatment Devices = Yes): The patient is status post radiation simulation and is evaluated as to the use of additional devices for shielding and placement for radiation therapy.
Pathology Override (Pathology Will Render As Diagnosis Name If Left Blank): BCC nodular
Functional Status: 1 (ambulatory, light activity)
Treatment Margins In Cm: 0.35
Cumulative Dose In Cgy (Optional): 2163.6
Energy (Optional-Please Include Units): 70
Treatment Margins In Cm: 0.5
Treatment Time In Min (Optional): 0.43

## 2021-02-09 ENCOUNTER — APPOINTMENT (OUTPATIENT)
Age: 76
Setting detail: DERMATOLOGY
End: 2021-02-09

## 2021-02-09 VITALS — TEMPERATURE: 98.2 F

## 2021-02-09 PROBLEM — C44.311 BASAL CELL CARCINOMA OF SKIN OF NOSE: Status: ACTIVE | Noted: 2021-02-09

## 2021-02-09 PROBLEM — C44.329 SQUAMOUS CELL CARCINOMA OF SKIN OF OTHER PARTS OF FACE: Status: ACTIVE | Noted: 2021-02-09

## 2021-02-09 PROCEDURE — 77280 THER RAD SIMULAJ FIELD SMPL: CPT

## 2021-02-09 PROCEDURE — OTHER SUPERFICIAL RADIATION TREATMENT: OTHER

## 2021-02-09 PROCEDURE — G6001 ECHO GUIDANCE RADIOTHERAPY: HCPCS

## 2021-02-09 PROCEDURE — OTHER FOLLOW UP FOR NEXT VISIT: OTHER

## 2021-02-09 PROCEDURE — 77401 RADIATION TX DELIVERY SUPFC: CPT

## 2021-02-09 PROCEDURE — OTHER TREATMENT REGIMEN: OTHER

## 2021-02-09 NOTE — PROCEDURE: TREATMENT REGIMEN
Plan: Treatment with u/s\\n\\nThis patient has been treated today with image guided superficial radiation therapy for non-melanoma\\nskin cancer.\\n\\nWritten informed consent has been previously obtained from this patient for this treatment. This\\nconsent is documented in the patient’s chart. The patient gave verbal consent to continue treatment\\ntoday.\\n\\nThe patient was treated with a specific radiation dose and setup as prescribed by the provider listed on\\nthis visit note. A Radiation Therapist performed administration of radiation under supervision of\\nprovider. The treatment parameters and cumulative dose are indicated above.\\nPrior to administering the radiation, the patient underwent a verification therapeutic radiology\\nsimulation-aided field setting defining relevant normal and abnormal target anatomy and acquiring\\nimages with high frequency ultrasound in addition to data necessary developing optimal radiation\\ntreatment process for the patient. This process includes verification of the treatment port(s) and proper\\ntreatment positioning. All treatment ports were photographed within electronic medical record. The\\npatient’s customized lead blocking along with gross tumor volume and margin was\\nconfirmed. Considering superficial radiotherapy is clinical in setup, this requires physician and\\nradiation therapist to clarify location interest being treated against initial images, pathology and\\npatient anatomy. Care was taken ensuring fields treated were geometrically accurate and properly\\npositioned using therapeutic radiology simulation-aided field setting verification per fraction. This\\nprocess is also utilized to determine if any prescription or setup changes are necessary. These steps are,\\ntherefore, medically necessary ensuring safe and effective administration of radiation. Ongoing\\ntherapeutic radiology simulation-aided field setting verification is ordered throughout course of\\ntherapy.\\n\\nA high frequency ultrasound image was acquired today for two-dimensional evaluation of the tumor\\nvolume and response to treatment, in addition to geometric accuracy of field placement. US depth Is\\n1.01 mm, which is +/- 0.10 mm in difference from previous imaging. The field placement and\\nultrasound imaging, per fraction, is separate and distinct from the initial simulation, and is an\\nimportant task in providing safe administration of superficial radiation therapy. Physician evaluation of\\nthe ultrasound tumor depth will be ongoing throughout the course of treatment, and is deemed\\nmedically necessary in order to ensure the efficacy of treatment and any necessary changes. Today’s\\nimage was evaluated for determination of continuation of treatment with the current plan or with\\nnecessary changes as appropriate. According to provider review of verification therapeutic radiology\\nsimulation-aided field setting and imaging, no change is required. Additionally, the use of ultrasound visualization \\nand targeted assessment allows the patient to be able to see their cancer(s) progress, encouraging patient to\\ncomplete and maintain compliance through full course of radiotherapy.\\n\\nPer Dr. Suarez, continued ultrasound guidance and therapeutic radiology simulation-aided field setting\\nverification per fraction is required for field placement, measurement of tumor depth, progress and\\nacute effect monitoring.\\n
Detail Level: Zone
Plan: Treatment with u/s\\n\\nThis patient has been treated today with image guided superficial radiation therapy for non-melanoma\\nskin cancer.\\n\\nWritten informed consent has been previously obtained from this patient for this treatment. This\\nconsent is documented in the patient’s chart. The patient gave verbal consent to continue treatment\\ntoday.\\n\\nThe patient was treated with a specific radiation dose and setup as prescribed by the provider listed on\\nthis visit note. A Radiation Therapist performed administration of radiation under supervision of\\nprovider. The treatment parameters and cumulative dose are indicated above.\\nPrior to administering the radiation, the patient underwent a verification therapeutic radiology\\nsimulation-aided field setting defining relevant normal and abnormal target anatomy and acquiring\\nimages with high frequency ultrasound in addition to data necessary developing optimal radiation\\ntreatment process for the patient. This process includes verification of the treatment port(s) and proper\\ntreatment positioning. All treatment ports were photographed within electronic medical record. The\\npatient’s customized lead blocking along with gross tumor volume and margin was\\nconfirmed. Considering superficial radiotherapy is clinical in setup, this requires physician and\\nradiation therapist to clarify location interest being treated against initial images, pathology and\\npatient anatomy. Care was taken ensuring fields treated were geometrically accurate and properly\\npositioned using therapeutic radiology simulation-aided field setting verification per fraction. This\\nprocess is also utilized to determine if any prescription or setup changes are necessary. These steps are,\\ntherefore, medically necessary ensuring safe and effective administration of radiation. Ongoing\\ntherapeutic radiology simulation-aided field setting verification is ordered throughout course of\\ntherapy.\\n\\nA high frequency ultrasound image was acquired today for two-dimensional evaluation of the tumor\\nvolume and response to treatment, in addition to geometric accuracy of field placement. US depth Is\\n0.98 mm, which is +/- 0.04 mm in difference from previous imaging. The field placement and\\nultrasound imaging, per fraction, is separate and distinct from the initial simulation, and is an\\nimportant task in providing safe administration of superficial radiation therapy. Physician evaluation of\\nthe ultrasound tumor depth will be ongoing throughout the course of treatment, and is deemed\\nmedically necessary in order to ensure the efficacy of treatment and any necessary changes. Today’s\\nimage was evaluated for determination of continuation of treatment with the current plan or with\\nnecessary changes as appropriate. According to provider review of verification therapeutic radiology\\nsimulation-aided field setting and imaging, no change is required. Additionally, the use of ultrasound visualization \\nand targeted assessment allows the patient to be able to see their cancer(s) progress, encouraging patient to\\ncomplete and maintain compliance through full course of radiotherapy.\\n\\nPer Dr. Suarez, continued ultrasound guidance and therapeutic radiology simulation-aided field setting\\nverification per fraction is required for field placement, measurement of tumor depth, progress and\\nacute effect monitoring.\\n

## 2021-02-09 NOTE — PROCEDURE: SUPERFICIAL RADIATION TREATMENT
Dimensions-X Axis In Cm: 1
Simple Simulation Afterword Text Will Be Included With Simple Simulations (Indications............): The patient had a complete consultation regarding all applicable modalities for the treatment of their skin cancer and based on a variety of factors including the type of tumor, size, and location, the relevant medical history as well as local tissue factors, the functional status of the individual, the ability to perform necessary postoperative wound instructions and the need for simultaneous treatments as well as overall wound healing status, it was determined that the patient would begin radiation therapy treatment for skin cancer.  A full simulation and treatment device design was performed including the determination and formulation of appropriate simple and complex devices including lead shield of 0.762 mm thickness to form molded customized shielding to specifically correlate with the lesion size including treatment margin.  The custom lead shield is adequate to accommodate the appropriate applicator and provide adequate shielding around the treatment site.  The specific field applicator, shields, and devices both simple and complex as well as the specific patient setup is outlined below.  The patient was given a full consent for superficial radiation to both verbally and in writing and the full determination of patient's eligibility for treatment and selection is outlined on the patient eligibility and treatment selection form.  The specific superficial radiotherapy prescription was determined and was documented on the superficial radiotherapy prescription form.  A treatment calculation was also performed and documented on the treatment calculation form.  Based on the prescription, the patient was scheduled for a series of fractional treatments.
Bill For Simulation And Treatment Device Design: No
Day Of The Week Treatment Administered: Tuesday
Daily Fractionated Dose (Optional- Include Units): 270.90 cGy
Assessment: Appropriate reaction
Include Rx 3 When Rendering Additional Prescriptions: Yes
Fractionation Number (Evaluation): 5
Port Dimensions-Y Axis In Cm: 2
Total Dose (Optional-Please Include Units): 5409.0
Energy (Optional-Please Include Units): 70
Dose Per Fractionation In Cgy (Optional): 270.9
Treatment Device Design After Initial Simulation Justification (Will Render If Bill For Treatment Devices = Yes): The patient is status post radiation simulation and is evaluated as to the use of additional devices for shielding and placement for radiation therapy.
Pathology Override (Pathology Will Render As Diagnosis Name If Left Blank): BCC nodular
Field Size (Applicator): 1.5 cm
Custom Shielding Preamble Text Will Not Be Included With Simple Simulations (.......... X X Y Cm): A lead shield of 0.762 mm thickness is utilized to form a molded, custom shield with a
Comments: On Target, RTOG 1
Field Size (Applicator): 2.5 cm
Functional Status: 1 (ambulatory, light activity)
Treatment Time / Fractionation (Optional- Include Units): 0.43 min
Detail Level: Detailed
Information: Selecting Yes will display possible errors in your note based on the variables you have selected. This validation is only offered as a suggestion for you. PLEASE NOTE THAT THE VALIDATION TEXT WILL BE REMOVED WHEN YOU FINALIZE YOUR NOTE. IF YOU WANT TO FAX A PRELIMINARY NOTE YOU WILL NEED TO TOGGLE THIS TO 'NO' IF YOU DO NOT WANT IT IN YOUR FAXED NOTE.
Fractions / Week: 3
Depth (Optional-Please Include Units): .85 mm
Treatment Time In Min (Optional): 0.43
Total Number Of Fractions Rx 2: 15
Custom Shielding Afterword Text Will Not Be Included With Simple Simulations (X X Y Cm............): port to correlate with the lesion size, including treatment margin. The custom lead shield is adequate to accommodate the appropriate applicator and provide adequate shielding around the treatment site. Additional shielding (as noted below) is used to protect sensitive, normal tissues.
Computed Treatment Time In Min (Will Render The Same As Calculated Treatment Time If Left Blank): 0.45 min
Treatment Time In Min (Optional): 0.45
Fractionation Number: 9
Simple Simulation Preamble Text Will Be Included With Simple Simulations (.......... Indications): Simple simulation was performed today for the following reasons:
Intro Statement (Will Not Render If Left Blank): The patient is undergoing superficial radiation therapy for skin cancer and presents for weekly evaluation and management.  Per protocol and as documented on the flow sheet, the patient was questioned as to subjective redness, pruritus, pain, drainage, fatigue, or any other symptoms.  Objectively, the radiation area was evaluated with regards to erythema, atrophy, scale, crusting, erosion, ulceration, edema, purpura, tenderness, warmth, drainage, and any other findings.  The plan was extensively reviewed including the dose, and dosing schedule.  The simulation and clinical setup was also reviewed as was the external and any internal shields and based on this review the appropriateness and sufficiency of treatment was determined.
Energy (Optional-Please Include Units): 70 KV
Treatment Margins In Cm: 0.35
Port Dimensions-X Axis In Cm: 0.8
Total Dose (Optional-Please Include Units): 5418.0
Daily Fractionated Dose (Optional- Include Units): 270.45 cGy
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
Dose / Tx In Cgy (Optional): 270.90
Dose / Tx In Cgy (Optional): 270.45
Depth (Optional-Please Include Units): 0.97 mm
Patient Positioning: Sitting
Cumulative Dose In Cgy (Optional): 6538.10
Shielding Size (Optional- Include Units): 2.0 cm
Cumulative Dose In Cgy (Optional): 2434.05
Total Number Of Fractions: 20
Energy (Include Units): 70kV
Time Dose Fractionation (Optional- Include Units If Applicable): 96
Treatment Margins In Cm: 0.5
Shielding Size (Optional- Include Units): none

## 2021-02-12 ENCOUNTER — APPOINTMENT (OUTPATIENT)
Age: 76
Setting detail: DERMATOLOGY
End: 2021-02-15

## 2021-02-12 VITALS — TEMPERATURE: 98 F

## 2021-02-12 PROBLEM — C44.311 BASAL CELL CARCINOMA OF SKIN OF NOSE: Status: ACTIVE | Noted: 2021-02-12

## 2021-02-12 PROBLEM — C44.329 SQUAMOUS CELL CARCINOMA OF SKIN OF OTHER PARTS OF FACE: Status: ACTIVE | Noted: 2021-02-12

## 2021-02-12 PROCEDURE — OTHER FOLLOW UP FOR NEXT VISIT: OTHER

## 2021-02-12 PROCEDURE — 77427 RADIATION TX MANAGEMENT X5: CPT

## 2021-02-12 PROCEDURE — OTHER SUPERFICIAL RADIATION TREATMENT: OTHER

## 2021-02-12 PROCEDURE — 77401 RADIATION TX DELIVERY SUPFC: CPT

## 2021-02-12 PROCEDURE — OTHER TREATMENT REGIMEN: OTHER

## 2021-02-12 PROCEDURE — G6001 ECHO GUIDANCE RADIOTHERAPY: HCPCS

## 2021-02-12 PROCEDURE — 77280 THER RAD SIMULAJ FIELD SMPL: CPT

## 2021-02-12 NOTE — PROCEDURE: SUPERFICIAL RADIATION TREATMENT
Fractions / Week Rx 4: 5
Initial Radiation Treatment Planning (Will Render If Bill Simulation = Yes): The patient had a complete consultation regarding all applicable modalities for the treatment of their skin cancer and based on a variety of factors including the type of tumor, size, and location, the relevant medical history as well as local tissue factors, the functional status of the individual, the ability to perform necessary postoperative wound instructions and the need for simultaneous treatments as well as overall wound healing status, it was determined that the patient would begin radiation therapy treatment for skin cancer.  A full simulation and treatment device design was performed including the determination and formulation of appropriate simple and complex devices including lead shield of 0.762 mm thickness to form molded customized shielding to specifically correlate with the lesion size including treatment margin.  The custom lead shield is adequate to accommodate the appropriate applicator and provide adequate shielding around the treatment site.  The specific field applicator, shields, and devices both simple and complex as well as the specific patient setup is outlined below.  The patient was given a full consent for superficial radiation to both verbally and in writing and the full determination of patient's eligibility for treatment and selection is outlined on the patient eligibility and treatment selection form.  The specific superficial radiotherapy prescription was determined and was documented on the superficial radiotherapy prescription form.  A treatment calculation was also performed and documented on the treatment calculation form.  Based on the prescription, the patient was scheduled for a series of fractional treatments.
Daily Fractionated Dose (Optional- Include Units): 270.90 cGy
Render Additional Prescriptions In Note?: No
Field Size (Applicator): 2.5 cm
Computed Treatment Time In Min (Will Render The Same As Calculated Treatment Time If Left Blank): 0.43 min
Bill And Render Text From Evaluation And Management Tab (Will Bill [id number]): Yes
Functional Status: 1 (ambulatory, light activity)
Fractionation Number (Evaluation): 10
Number Of Days Off Treatment: 1
Total Number Of Fractions: 20
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
Port Dimensions-X Axis In Cm: 2
Treatment Device Design After Initial Simulation Justification (Will Render If Bill For Treatment Devices = Yes): The patient is status post radiation simulation and is evaluated as to the use of additional devices for shielding and placement for radiation therapy.
Total Number Of Fractions Rx 4: 15
Total Dose (Optional-Please Include Units): 5418.0
Detail Level: Detailed
Intro Statement (Will Not Render If Left Blank): The patient is undergoing superficial radiation therapy for skin cancer and presents for weekly evaluation and management.  Per protocol and as documented on the flow sheet, the patient was questioned as to subjective redness, pruritus, pain, drainage, fatigue, or any other symptoms.  Objectively, the radiation area was evaluated with regards to erythema, atrophy, scale, crusting, erosion, ulceration, edema, purpura, tenderness, warmth, drainage, and any other findings.  The plan was extensively reviewed including the dose, and dosing schedule.  The simulation and clinical setup was also reviewed as was the external and any internal shields and based on this review the appropriateness and sufficiency of treatment was determined.
Assessment: Appropriate reaction
Energy (Optional-Please Include Units): 70 KV
Treatment Time In Min (Optional): 0.45
Custom Shielding Afterword Text Will Not Be Included With Simple Simulations (X X Y Cm............): port to correlate with the lesion size, including treatment margin. The custom lead shield is adequate to accommodate the appropriate applicator and provide adequate shielding around the treatment site. Additional shielding (as noted below) is used to protect sensitive, normal tissues.
Field Size (Applicator): 1.5 cm
Fractions / Week: 3
Total Dose (Optional-Please Include Units): 5409.0
Energy (Optional-Please Include Units): 70
Patient Positioning: Sitting
Time Dose Fractionation (Optional- Include Units If Applicable): 96
Cumulative Dose In Cgy (Optional): 2709.0
Dimensions-X Axis In Cm: 0.8
Treatment Time In Min (Optional): 0.43
Energy (Include Units): 70kV
Computed Treatment Time In Min (Will Render The Same As Calculated Treatment Time If Left Blank): 0.45 min
Comments: On Target, RTOG 1
Dose / Tx In Cgy (Optional): 270.45
Information: Selecting Yes will display possible errors in your note based on the variables you have selected. This validation is only offered as a suggestion for you. PLEASE NOTE THAT THE VALIDATION TEXT WILL BE REMOVED WHEN YOU FINALIZE YOUR NOTE. IF YOU WANT TO FAX A PRELIMINARY NOTE YOU WILL NEED TO TOGGLE THIS TO 'NO' IF YOU DO NOT WANT IT IN YOUR FAXED NOTE.
Custom Shielding Preamble Text Will Not Be Included With Simple Simulations (.......... X X Y Cm): A lead shield of 0.762 mm thickness is utilized to form a molded, custom shield with a
Pathology Override (Pathology Will Render As Diagnosis Name If Left Blank): BCC nodular
Simple Simulation Preamble Text Will Be Included With Simple Simulations (.......... Indications): Simple simulation was performed today for the following reasons:
Cumulative Dose In Cgy (Optional): 2704.5
Depth (Optional-Please Include Units): .85 mm
Treatment Margins In Cm: 0.5
Shielding Size (Optional- Include Units): none
Day Of The Week Treatment Administered: Friday
Depth (Optional-Please Include Units): 0.97 mm
Daily Fractionated Dose (Optional- Include Units): 270.45 cGy
Shielding Size (Optional- Include Units): 2.0 cm
Treatment Margins In Cm: 0.35
Dose Per Fractionation In Cgy (Optional): 270.9
Dose / Tx In Cgy (Optional): 270.90

## 2021-02-12 NOTE — PROCEDURE: TREATMENT REGIMEN
Detail Level: Zone
Plan: Treatment with u/s\\n\\nThis patient has been treated today with image guided superficial radiation therapy for non-melanoma\\nskin cancer.\\n\\nWritten informed consent has been previously obtained from this patient for this treatment. This\\nconsent is documented in the patient’s chart. The patient gave verbal consent to continue treatment\\ntoday.\\n\\nThe patient was treated with a specific radiation dose and setup as prescribed by the provider listed on\\nthis visit note. A Radiation Therapist performed administration of radiation under supervision of\\nprovider. The treatment parameters and cumulative dose are indicated above.\\nPrior to administering the radiation, the patient underwent a verification therapeutic radiology\\nsimulation-aided field setting defining relevant normal and abnormal target anatomy and acquiring\\nimages with high frequency ultrasound in addition to data necessary developing optimal radiation\\ntreatment process for the patient. This process includes verification of the treatment port(s) and proper\\ntreatment positioning. All treatment ports were photographed within electronic medical record. The\\npatient’s customized lead blocking along with gross tumor volume and margin was\\nconfirmed. Considering superficial radiotherapy is clinical in setup, this requires physician and\\nradiation therapist to clarify location interest being treated against initial images, pathology and\\npatient anatomy. Care was taken ensuring fields treated were geometrically accurate and properly\\npositioned using therapeutic radiology simulation-aided field setting verification per fraction. This\\nprocess is also utilized to determine if any prescription or setup changes are necessary. These steps are,\\ntherefore, medically necessary ensuring safe and effective administration of radiation. Ongoing\\ntherapeutic radiology simulation-aided field setting verification is ordered throughout course of\\ntherapy.\\n\\nA high frequency ultrasound image was acquired today for two-dimensional evaluation of the tumor\\nvolume and response to treatment, in addition to geometric accuracy of field placement. US depth Is\\n0.98 mm, which is +/- 0.03 mm in difference from previous imaging. The field placement and\\nultrasound imaging, per fraction, is separate and distinct from the initial simulation, and is an\\nimportant task in providing safe administration of superficial radiation therapy. Physician evaluation of\\nthe ultrasound tumor depth will be ongoing throughout the course of treatment, and is deemed\\nmedically necessary in order to ensure the efficacy of treatment and any necessary changes. Today’s\\nimage was evaluated for determination of continuation of treatment with the current plan or with\\nnecessary changes as appropriate. According to provider review of verification therapeutic radiology\\nsimulation-aided field setting and imaging, no change is required. Additionally, the use of ultrasound visualization \\nand targeted assessment allows the patient to be able to see their cancer(s) progress, encouraging patient to\\ncomplete and maintain compliance through full course of radiotherapy.\\n\\nPer Dr. Suarez, continued ultrasound guidance and therapeutic radiology simulation-aided field setting\\nverification per fraction is required for field placement, measurement of tumor depth, progress and\\nacute effect monitoring.\\n
Plan: Treatment with u/s\\n\\nThis patient has been treated today with image guided superficial radiation therapy for non-melanoma\\nskin cancer.\\n\\nWritten informed consent has been previously obtained from this patient for this treatment. This\\nconsent is documented in the patient’s chart. The patient gave verbal consent to continue treatment\\ntoday.\\n\\nThe patient was treated with a specific radiation dose and setup as prescribed by the provider listed on\\nthis visit note. A Radiation Therapist performed administration of radiation under supervision of\\nprovider. The treatment parameters and cumulative dose are indicated above.\\nPrior to administering the radiation, the patient underwent a verification therapeutic radiology\\nsimulation-aided field setting defining relevant normal and abnormal target anatomy and acquiring\\nimages with high frequency ultrasound in addition to data necessary developing optimal radiation\\ntreatment process for the patient. This process includes verification of the treatment port(s) and proper\\ntreatment positioning. All treatment ports were photographed within electronic medical record. The\\npatient’s customized lead blocking along with gross tumor volume and margin was\\nconfirmed. Considering superficial radiotherapy is clinical in setup, this requires physician and\\nradiation therapist to clarify location interest being treated against initial images, pathology and\\npatient anatomy. Care was taken ensuring fields treated were geometrically accurate and properly\\npositioned using therapeutic radiology simulation-aided field setting verification per fraction. This\\nprocess is also utilized to determine if any prescription or setup changes are necessary. These steps are,\\ntherefore, medically necessary ensuring safe and effective administration of radiation. Ongoing\\ntherapeutic radiology simulation-aided field setting verification is ordered throughout course of\\ntherapy.\\n\\nA high frequency ultrasound image was acquired today for two-dimensional evaluation of the tumor\\nvolume and response to treatment, in addition to geometric accuracy of field placement. US depth Is\\n1.05 mm, which is +/- 0.07 mm in difference from previous imaging. The field placement and\\nultrasound imaging, per fraction, is separate and distinct from the initial simulation, and is an\\nimportant task in providing safe administration of superficial radiation therapy. Physician evaluation of\\nthe ultrasound tumor depth will be ongoing throughout the course of treatment, and is deemed\\nmedically necessary in order to ensure the efficacy of treatment and any necessary changes. Today’s\\nimage was evaluated for determination of continuation of treatment with the current plan or with\\nnecessary changes as appropriate. According to provider review of verification therapeutic radiology\\nsimulation-aided field setting and imaging, no change is required. Additionally, the use of ultrasound visualization \\nand targeted assessment allows the patient to be able to see their cancer(s) progress, encouraging patient to\\ncomplete and maintain compliance through full course of radiotherapy.\\n\\nPer Dr. Suarez, continued ultrasound guidance and therapeutic radiology simulation-aided field setting\\nverification per fraction is required for field placement, measurement of tumor depth, progress and\\nacute effect monitoring.\\n

## 2021-02-23 ENCOUNTER — APPOINTMENT (OUTPATIENT)
Age: 76
Setting detail: DERMATOLOGY
End: 2021-02-28

## 2021-02-23 PROBLEM — C44.329 SQUAMOUS CELL CARCINOMA OF SKIN OF OTHER PARTS OF FACE: Status: ACTIVE | Noted: 2021-02-23

## 2021-02-23 PROBLEM — C44.311 BASAL CELL CARCINOMA OF SKIN OF NOSE: Status: ACTIVE | Noted: 2021-02-23

## 2021-02-23 PROCEDURE — OTHER TREATMENT REGIMEN: OTHER

## 2021-02-23 PROCEDURE — 77280 THER RAD SIMULAJ FIELD SMPL: CPT

## 2021-02-23 PROCEDURE — OTHER FOLLOW UP FOR NEXT VISIT: OTHER

## 2021-02-23 PROCEDURE — 77401 RADIATION TX DELIVERY SUPFC: CPT

## 2021-02-23 PROCEDURE — OTHER SUPERFICIAL RADIATION TREATMENT: OTHER

## 2021-02-23 PROCEDURE — G6001 ECHO GUIDANCE RADIOTHERAPY: HCPCS

## 2021-02-23 NOTE — PROCEDURE: TREATMENT REGIMEN
Detail Level: Zone
Plan: Treatment with u/s\\n\\nThis patient has been treated today with image guided superficial radiation therapy for non-melanoma\\nskin cancer.\\n\\nWritten informed consent has been previously obtained from this patient for this treatment. This\\nconsent is documented in the patient’s chart. The patient gave verbal consent to continue treatment\\ntoday.\\n\\nThe patient was treated with a specific radiation dose and setup as prescribed by the provider listed on\\nthis visit note. A Radiation Therapist performed administration of radiation under supervision of\\nprovider. The treatment parameters and cumulative dose are indicated above.\\nPrior to administering the radiation, the patient underwent a verification therapeutic radiology\\nsimulation-aided field setting defining relevant normal and abnormal target anatomy and acquiring\\nimages with high frequency ultrasound in addition to data necessary developing optimal radiation\\ntreatment process for the patient. This process includes verification of the treatment port(s) and proper\\ntreatment positioning. All treatment ports were photographed within electronic medical record. The\\npatient’s customized lead blocking along with gross tumor volume and margin was\\nconfirmed. Considering superficial radiotherapy is clinical in setup, this requires physician and\\nradiation therapist to clarify location interest being treated against initial images, pathology and\\npatient anatomy. Care was taken ensuring fields treated were geometrically accurate and properly\\npositioned using therapeutic radiology simulation-aided field setting verification per fraction. This\\nprocess is also utilized to determine if any prescription or setup changes are necessary. These steps are,\\ntherefore, medically necessary ensuring safe and effective administration of radiation. Ongoing\\ntherapeutic radiology simulation-aided field setting verification is ordered throughout course of\\ntherapy.\\n\\nA high frequency ultrasound image was acquired today for two-dimensional evaluation of the tumor\\nvolume and response to treatment, in addition to geometric accuracy of field placement. US depth Is\\n0.97 mm, which is +/- 0.01 mm in difference from previous imaging. The field placement and\\nultrasound imaging, per fraction, is separate and distinct from the initial simulation, and is an\\nimportant task in providing safe administration of superficial radiation therapy. Physician evaluation of\\nthe ultrasound tumor depth will be ongoing throughout the course of treatment, and is deemed\\nmedically necessary in order to ensure the efficacy of treatment and any necessary changes. Today’s\\nimage was evaluated for determination of continuation of treatment with the current plan or with\\nnecessary changes as appropriate. According to provider review of verification therapeutic radiology\\nsimulation-aided field setting and imaging, no change is required. Additionally, the use of ultrasound visualization \\nand targeted assessment allows the patient to be able to see their cancer(s) progress, encouraging patient to\\ncomplete and maintain compliance through full course of radiotherapy.\\n\\nPer Dr. Suarez, continued ultrasound guidance and therapeutic radiology simulation-aided field setting\\nverification per fraction is required for field placement, measurement of tumor depth, progress and\\nacute effect monitoring.\\n
Plan: Treatment with u/s\\n\\nThis patient has been treated today with image guided superficial radiation therapy for non-melanoma\\nskin cancer.\\n\\nWritten informed consent has been previously obtained from this patient for this treatment. This\\nconsent is documented in the patient’s chart. The patient gave verbal consent to continue treatment\\ntoday.\\n\\nThe patient was treated with a specific radiation dose and setup as prescribed by the provider listed on\\nthis visit note. A Radiation Therapist performed administration of radiation under supervision of\\nprovider. The treatment parameters and cumulative dose are indicated above.\\nPrior to administering the radiation, the patient underwent a verification therapeutic radiology\\nsimulation-aided field setting defining relevant normal and abnormal target anatomy and acquiring\\nimages with high frequency ultrasound in addition to data necessary developing optimal radiation\\ntreatment process for the patient. This process includes verification of the treatment port(s) and proper\\ntreatment positioning. All treatment ports were photographed within electronic medical record. The\\npatient’s customized lead blocking along with gross tumor volume and margin was\\nconfirmed. Considering superficial radiotherapy is clinical in setup, this requires physician and\\nradiation therapist to clarify location interest being treated against initial images, pathology and\\npatient anatomy. Care was taken ensuring fields treated were geometrically accurate and properly\\npositioned using therapeutic radiology simulation-aided field setting verification per fraction. This\\nprocess is also utilized to determine if any prescription or setup changes are necessary. These steps are,\\ntherefore, medically necessary ensuring safe and effective administration of radiation. Ongoing\\ntherapeutic radiology simulation-aided field setting verification is ordered throughout course of\\ntherapy.\\n\\nA high frequency ultrasound image was acquired today for two-dimensional evaluation of the tumor\\nvolume and response to treatment, in addition to geometric accuracy of field placement. US depth Is\\n1.55 mm, which is +/- 0.50 mm in difference from previous imaging. The field placement and\\nultrasound imaging, per fraction, is separate and distinct from the initial simulation, and is an\\nimportant task in providing safe administration of superficial radiation therapy. Physician evaluation of\\nthe ultrasound tumor depth will be ongoing throughout the course of treatment, and is deemed\\nmedically necessary in order to ensure the efficacy of treatment and any necessary changes. Today’s\\nimage was evaluated for determination of continuation of treatment with the current plan or with\\nnecessary changes as appropriate. According to provider review of verification therapeutic radiology\\nsimulation-aided field setting and imaging, no change is required. Additionally, the use of ultrasound visualization \\nand targeted assessment allows the patient to be able to see their cancer(s) progress, encouraging patient to\\ncomplete and maintain compliance through full course of radiotherapy.\\n\\nPer Dr. Suarez, continued ultrasound guidance and therapeutic radiology simulation-aided field setting\\nverification per fraction is required for field placement, measurement of tumor depth, progress and\\nacute effect monitoring.\\n

## 2021-02-23 NOTE — PROCEDURE: SUPERFICIAL RADIATION TREATMENT
Bill For Treatment Devices Only: No
Comments: On Target, RTOG 1
Initial Radiation Treatment Planning (Will Render If Bill Simulation = Yes): The patient had a complete consultation regarding all applicable modalities for the treatment of their skin cancer and based on a variety of factors including the type of tumor, size, and location, the relevant medical history as well as local tissue factors, the functional status of the individual, the ability to perform necessary postoperative wound instructions and the need for simultaneous treatments as well as overall wound healing status, it was determined that the patient would begin radiation therapy treatment for skin cancer.  A full simulation and treatment device design was performed including the determination and formulation of appropriate simple and complex devices including lead shield of 0.762 mm thickness to form molded customized shielding to specifically correlate with the lesion size including treatment margin.  The custom lead shield is adequate to accommodate the appropriate applicator and provide adequate shielding around the treatment site.  The specific field applicator, shields, and devices both simple and complex as well as the specific patient setup is outlined below.  The patient was given a full consent for superficial radiation to both verbally and in writing and the full determination of patient's eligibility for treatment and selection is outlined on the patient eligibility and treatment selection form.  The specific superficial radiotherapy prescription was determined and was documented on the superficial radiotherapy prescription form.  A treatment calculation was also performed and documented on the treatment calculation form.  Based on the prescription, the patient was scheduled for a series of fractional treatments.
Dose Per Fractionation In Cgy (Optional): 270.9
Fractions / Week: 3
Patient Positioning: Sitting
Custom Shielding Afterword Text Will Not Be Included With Simple Simulations (X X Y Cm............): port to correlate with the lesion size, including treatment margin. The custom lead shield is adequate to accommodate the appropriate applicator and provide adequate shielding around the treatment site. Additional shielding (as noted below) is used to protect sensitive, normal tissues.
Field Number: 2
Day Of The Week Treatment Administered: Tuesday
Field Size (Applicator): 2.0 cm
Fractionation Number (Evaluation): 10
Assessment: Appropriate reaction
Treatment Device Design After Initial Simulation Justification (Will Render If Bill For Treatment Devices = Yes): The patient is status post radiation simulation and is evaluated as to the use of additional devices for shielding and placement for radiation therapy.
Total Dose (Optional-Please Include Units): 5409.0
Port Dimensions-X Axis In Cm: 0.8
Total Number Of Fractions Rx 4: 15
Treatment Margins In Cm: 0.5
Prescription Used: 1
Treatment Time / Fractionation (Optional- Include Units): 0.43 min
Treatment Time / Fractionation (Optional- Include Units): 0.45 min
Depth (Optional-Please Include Units): .85 mm
Time Dose Fractionation (Optional- Include Units If Applicable): 96
Fractionation Number: 11
Bill For Radiation Treatment: Yes
Daily Fractionated Dose (Optional- Include Units): 270.45 cGy
Detail Level: Detailed
Field Size (Applicator): 1.5 cm
Total Number Of Fractions: 20
Fractions / Week Rx 2: 5
Treatment Time In Min (Optional): 0.43
Dose / Tx In Cgy (Optional): 270.45
Pathology Override (Pathology Will Render As Diagnosis Name If Left Blank): BCC nodular
Shielding Size (Optional- Include Units): none
Simple Simulation Preamble Text Will Be Included With Simple Simulations (.......... Indications): Simple simulation was performed today for the following reasons:
Energy (Optional-Please Include Units): 70 KV
Custom Shielding Preamble Text Will Not Be Included With Simple Simulations (.......... X X Y Cm): A lead shield of 0.762 mm thickness is utilized to form a molded, custom shield with a
Energy (Include Units): 70kV
Energy (Optional-Please Include Units): 70
Field Size (Applicator): 2.5 cm
Total Dose (Optional-Please Include Units): 5418.0
Intro Statement (Will Not Render If Left Blank): The patient is undergoing superficial radiation therapy for skin cancer and presents for weekly evaluation and management.  Per protocol and as documented on the flow sheet, the patient was questioned as to subjective redness, pruritus, pain, drainage, fatigue, or any other symptoms.  Objectively, the radiation area was evaluated with regards to erythema, atrophy, scale, crusting, erosion, ulceration, edema, purpura, tenderness, warmth, drainage, and any other findings.  The plan was extensively reviewed including the dose, and dosing schedule.  The simulation and clinical setup was also reviewed as was the external and any internal shields and based on this review the appropriateness and sufficiency of treatment was determined.
Information: Selecting Yes will display possible errors in your note based on the variables you have selected. This validation is only offered as a suggestion for you. PLEASE NOTE THAT THE VALIDATION TEXT WILL BE REMOVED WHEN YOU FINALIZE YOUR NOTE. IF YOU WANT TO FAX A PRELIMINARY NOTE YOU WILL NEED TO TOGGLE THIS TO 'NO' IF YOU DO NOT WANT IT IN YOUR FAXED NOTE.
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
Dose / Tx In Cgy (Optional): 270.90
Cumulative Dose In Cgy (Optional): 2974.95
Functional Status: 1 (ambulatory, light activity)
Depth (Optional-Please Include Units): 0.97 mm
Treatment Time In Min (Optional): 0.45
Cumulative Dose In Cgy (Optional): 2979.9
Treatment Margins In Cm: 0.35
Daily Fractionated Dose (Optional- Include Units): 270.90 cGy

## 2021-02-25 ENCOUNTER — APPOINTMENT (OUTPATIENT)
Age: 76
Setting detail: DERMATOLOGY
End: 2021-02-28

## 2021-02-25 PROBLEM — C44.311 BASAL CELL CARCINOMA OF SKIN OF NOSE: Status: ACTIVE | Noted: 2021-02-25

## 2021-02-25 PROBLEM — C44.329 SQUAMOUS CELL CARCINOMA OF SKIN OF OTHER PARTS OF FACE: Status: ACTIVE | Noted: 2021-02-25

## 2021-02-25 PROCEDURE — OTHER FOLLOW UP FOR NEXT VISIT: OTHER

## 2021-02-25 PROCEDURE — 77401 RADIATION TX DELIVERY SUPFC: CPT

## 2021-02-25 PROCEDURE — G6001 ECHO GUIDANCE RADIOTHERAPY: HCPCS

## 2021-02-25 PROCEDURE — OTHER TREATMENT REGIMEN: OTHER

## 2021-02-25 PROCEDURE — OTHER SUPERFICIAL RADIATION TREATMENT: OTHER

## 2021-02-25 PROCEDURE — 77280 THER RAD SIMULAJ FIELD SMPL: CPT

## 2021-02-25 NOTE — PROCEDURE: TREATMENT REGIMEN
Detail Level: Zone
Plan: Treatment with u/s\\n\\nThis patient has been treated today with image guided superficial radiation therapy for non-melanoma\\nskin cancer.\\n\\nWritten informed consent has been previously obtained from this patient for this treatment. This\\nconsent is documented in the patient’s chart. The patient gave verbal consent to continue treatment\\ntoday.\\n\\nThe patient was treated with a specific radiation dose and setup as prescribed by the provider listed on\\nthis visit note. A Radiation Therapist performed administration of radiation under supervision of\\nprovider. The treatment parameters and cumulative dose are indicated above.\\nPrior to administering the radiation, the patient underwent a verification therapeutic radiology\\nsimulation-aided field setting defining relevant normal and abnormal target anatomy and acquiring\\nimages with high frequency ultrasound in addition to data necessary developing optimal radiation\\ntreatment process for the patient. This process includes verification of the treatment port(s) and proper\\ntreatment positioning. All treatment ports were photographed within electronic medical record. The\\npatient’s customized lead blocking along with gross tumor volume and margin was\\nconfirmed. Considering superficial radiotherapy is clinical in setup, this requires physician and\\nradiation therapist to clarify location interest being treated against initial images, pathology and\\npatient anatomy. Care was taken ensuring fields treated were geometrically accurate and properly\\npositioned using therapeutic radiology simulation-aided field setting verification per fraction. This\\nprocess is also utilized to determine if any prescription or setup changes are necessary. These steps are,\\ntherefore, medically necessary ensuring safe and effective administration of radiation. Ongoing\\ntherapeutic radiology simulation-aided field setting verification is ordered throughout course of\\ntherapy.\\n\\nA high frequency ultrasound image was acquired today for two-dimensional evaluation of the tumor\\nvolume and response to treatment, in addition to geometric accuracy of field placement. US depth Is\\n1.27 mm, which is +/- 0.28 mm in difference from previous imaging. The field placement and\\nultrasound imaging, per fraction, is separate and distinct from the initial simulation, and is an\\nimportant task in providing safe administration of superficial radiation therapy. Physician evaluation of\\nthe ultrasound tumor depth will be ongoing throughout the course of treatment, and is deemed\\nmedically necessary in order to ensure the efficacy of treatment and any necessary changes. Today’s\\nimage was evaluated for determination of continuation of treatment with the current plan or with\\nnecessary changes as appropriate. According to provider review of verification therapeutic radiology\\nsimulation-aided field setting and imaging, no change is required. Additionally, the use of ultrasound visualization \\nand targeted assessment allows the patient to be able to see their cancer(s) progress, encouraging patient to\\ncomplete and maintain compliance through full course of radiotherapy.\\n\\nPer Dr. Suarez, continued ultrasound guidance and therapeutic radiology simulation-aided field setting\\nverification per fraction is required for field placement, measurement of tumor depth, progress and\\nacute effect monitoring.\\n
Plan: Treatment with u/s\\n\\nThis patient has been treated today with image guided superficial radiation therapy for non-melanoma\\nskin cancer.\\n\\nWritten informed consent has been previously obtained from this patient for this treatment. This\\nconsent is documented in the patient’s chart. The patient gave verbal consent to continue treatment\\ntoday.\\n\\nThe patient was treated with a specific radiation dose and setup as prescribed by the provider listed on\\nthis visit note. A Radiation Therapist performed administration of radiation under supervision of\\nprovider. The treatment parameters and cumulative dose are indicated above.\\nPrior to administering the radiation, the patient underwent a verification therapeutic radiology\\nsimulation-aided field setting defining relevant normal and abnormal target anatomy and acquiring\\nimages with high frequency ultrasound in addition to data necessary developing optimal radiation\\ntreatment process for the patient. This process includes verification of the treatment port(s) and proper\\ntreatment positioning. All treatment ports were photographed within electronic medical record. The\\npatient’s customized lead blocking along with gross tumor volume and margin was\\nconfirmed. Considering superficial radiotherapy is clinical in setup, this requires physician and\\nradiation therapist to clarify location interest being treated against initial images, pathology and\\npatient anatomy. Care was taken ensuring fields treated were geometrically accurate and properly\\npositioned using therapeutic radiology simulation-aided field setting verification per fraction. This\\nprocess is also utilized to determine if any prescription or setup changes are necessary. These steps are,\\ntherefore, medically necessary ensuring safe and effective administration of radiation. Ongoing\\ntherapeutic radiology simulation-aided field setting verification is ordered throughout course of\\ntherapy.\\n\\nA high frequency ultrasound image was acquired today for two-dimensional evaluation of the tumor\\nvolume and response to treatment, in addition to geometric accuracy of field placement. US depth Is\\n0.87 mm, which is +/- 0.10 mm in difference from previous imaging. The field placement and\\nultrasound imaging, per fraction, is separate and distinct from the initial simulation, and is an\\nimportant task in providing safe administration of superficial radiation therapy. Physician evaluation of\\nthe ultrasound tumor depth will be ongoing throughout the course of treatment, and is deemed\\nmedically necessary in order to ensure the efficacy of treatment and any necessary changes. Today’s\\nimage was evaluated for determination of continuation of treatment with the current plan or with\\nnecessary changes as appropriate. According to provider review of verification therapeutic radiology\\nsimulation-aided field setting and imaging, no change is required. Additionally, the use of ultrasound visualization \\nand targeted assessment allows the patient to be able to see their cancer(s) progress, encouraging patient to\\ncomplete and maintain compliance through full course of radiotherapy.\\n\\nPer Dr. Suarez, continued ultrasound guidance and therapeutic radiology simulation-aided field setting\\nverification per fraction is required for field placement, measurement of tumor depth, progress and\\nacute effect monitoring.\\n

## 2021-02-25 NOTE — PROCEDURE: SUPERFICIAL RADIATION TREATMENT
Number Of Treatment Days: 1
Intro Statement (Will Not Render If Left Blank): The patient is undergoing superficial radiation therapy for skin cancer and presents for weekly evaluation and management.  Per protocol and as documented on the flow sheet, the patient was questioned as to subjective redness, pruritus, pain, drainage, fatigue, or any other symptoms.  Objectively, the radiation area was evaluated with regards to erythema, atrophy, scale, crusting, erosion, ulceration, edema, purpura, tenderness, warmth, drainage, and any other findings.  The plan was extensively reviewed including the dose, and dosing schedule.  The simulation and clinical setup was also reviewed as was the external and any internal shields and based on this review the appropriateness and sufficiency of treatment was determined.
Port Dimensions-Y Axis In Cm: 2
Simple Simulation Afterword Text Will Be Included With Simple Simulations (Indications............): The patient had a complete consultation regarding all applicable modalities for the treatment of their skin cancer and based on a variety of factors including the type of tumor, size, and location, the relevant medical history as well as local tissue factors, the functional status of the individual, the ability to perform necessary postoperative wound instructions and the need for simultaneous treatments as well as overall wound healing status, it was determined that the patient would begin radiation therapy treatment for skin cancer.  A full simulation and treatment device design was performed including the determination and formulation of appropriate simple and complex devices including lead shield of 0.762 mm thickness to form molded customized shielding to specifically correlate with the lesion size including treatment margin.  The custom lead shield is adequate to accommodate the appropriate applicator and provide adequate shielding around the treatment site.  The specific field applicator, shields, and devices both simple and complex as well as the specific patient setup is outlined below.  The patient was given a full consent for superficial radiation to both verbally and in writing and the full determination of patient's eligibility for treatment and selection is outlined on the patient eligibility and treatment selection form.  The specific superficial radiotherapy prescription was determined and was documented on the superficial radiotherapy prescription form.  A treatment calculation was also performed and documented on the treatment calculation form.  Based on the prescription, the patient was scheduled for a series of fractional treatments.
Functional Status: 1 (ambulatory, light activity)
Custom Shielding Afterword Text Will Not Be Included With Simple Simulations (X X Y Cm............): port to correlate with the lesion size, including treatment margin. The custom lead shield is adequate to accommodate the appropriate applicator and provide adequate shielding around the treatment site. Additional shielding (as noted below) is used to protect sensitive, normal tissues.
Render Text From Evaluation And Management Tab (Will Not Bill 00265): No
Assessment: Appropriate reaction
Daily Fractionated Dose (Optional- Include Units): 270.45 cGy
Depth (Optional-Please Include Units): 0.97 mm
Treatment Time In Min (Optional): 0.43
Energy (Optional-Please Include Units): 70 KV
Port Dimensions-Y Axis In Cm: 0.8
Total Number Of Fractions Rx 3: 15
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
Computed Treatment Time In Min (Will Render The Same As Calculated Treatment Time If Left Blank): 0.45 min
Fractionation Number: 12
Pathology Override (Pathology Will Render As Diagnosis Name If Left Blank): BCC nodular
Fractionation Number (Evaluation): 10
Detail Level: Detailed
Field Size (Applicator): 1.5 cm
Custom Shielding Preamble Text Will Not Be Included With Simple Simulations (.......... X X Y Cm): A lead shield of 0.762 mm thickness is utilized to form a molded, custom shield with a
Information: Selecting Yes will display possible errors in your note based on the variables you have selected. This validation is only offered as a suggestion for you. PLEASE NOTE THAT THE VALIDATION TEXT WILL BE REMOVED WHEN YOU FINALIZE YOUR NOTE. IF YOU WANT TO FAX A PRELIMINARY NOTE YOU WILL NEED TO TOGGLE THIS TO 'NO' IF YOU DO NOT WANT IT IN YOUR FAXED NOTE.
Bill For Radiation Treatment: Yes
Energy (Include Units): 70kV
Dose / Tx In Cgy (Optional): 270.45
Total Dose (Optional-Please Include Units): 5409.0
Daily Fractionated Dose (Optional- Include Units): 270.90 cGy
Energy (Optional-Please Include Units): 70
Computed Treatment Time In Min (Will Render The Same As Calculated Treatment Time If Left Blank): 0.43 min
Field Size (Applicator): 2.5 cm
Treatment Device Design After Initial Simulation Justification (Will Render If Bill For Treatment Devices = Yes): The patient is status post radiation simulation and is evaluated as to the use of additional devices for shielding and placement for radiation therapy.
Fractions / Week: 3
Dose Per Fractionation In Cgy (Optional): 270.9
Comments: On Target, RTOG 1
Day Of The Week Treatment Administered: Thursday
Fractions / Week Rx 2: 5
Cumulative Dose In Cgy (Optional): 2345.40
Depth (Optional-Please Include Units): .85 mm
Treatment Margins In Cm: 0.5
Treatment Time In Min (Optional): 0.45
Total Number Of Fractions: 20
Shielding Size (Optional- Include Units): none
Time Dose Fractionation (Optional- Include Units If Applicable): 96
Patient Positioning: Sitting
Shielding Size (Optional- Include Units): 2.0 cm
Dose / Tx In Cgy (Optional): 270.90
Simple Simulation Preamble Text Will Be Included With Simple Simulations (.......... Indications): Simple simulation was performed today for the following reasons:
Total Dose (Optional-Please Include Units): 5418.0
Treatment Margins In Cm: 0.35
Cumulative Dose In Cgy (Optional): 3250.80

## 2021-02-27 ENCOUNTER — APPOINTMENT (OUTPATIENT)
Age: 76
Setting detail: DERMATOLOGY
End: 2021-03-21

## 2021-02-27 PROCEDURE — 77336 RADIATION PHYSICS CONSULT: CPT

## 2021-03-02 ENCOUNTER — APPOINTMENT (OUTPATIENT)
Age: 76
Setting detail: DERMATOLOGY
End: 2021-03-06

## 2021-03-02 PROBLEM — C44.311 BASAL CELL CARCINOMA OF SKIN OF NOSE: Status: ACTIVE | Noted: 2021-03-02

## 2021-03-02 PROBLEM — C44.329 SQUAMOUS CELL CARCINOMA OF SKIN OF OTHER PARTS OF FACE: Status: ACTIVE | Noted: 2021-03-02

## 2021-03-02 PROCEDURE — OTHER SUPERFICIAL RADIATION TREATMENT: OTHER

## 2021-03-02 PROCEDURE — OTHER FOLLOW UP FOR NEXT VISIT: OTHER

## 2021-03-02 PROCEDURE — 77401 RADIATION TX DELIVERY SUPFC: CPT

## 2021-03-02 PROCEDURE — 77280 THER RAD SIMULAJ FIELD SMPL: CPT

## 2021-03-02 PROCEDURE — OTHER TREATMENT REGIMEN: OTHER

## 2021-03-02 PROCEDURE — G6001 ECHO GUIDANCE RADIOTHERAPY: HCPCS

## 2021-03-02 NOTE — PROCEDURE: SUPERFICIAL RADIATION TREATMENT
Shielding Size (Optional- Include Units): none
Treatment Time In Min (Optional): 0.45
Treatment Margins In Cm: 0.35
Field Size (Applicator): 1.5 cm
Fractions / Week: 3
Total Number Of Fractions: 20
Prescription Used: 1
Intro Statement (Will Not Render If Left Blank): The patient is undergoing superficial radiation therapy for skin cancer and presents for weekly evaluation and management.  Per protocol and as documented on the flow sheet, the patient was questioned as to subjective redness, pruritus, pain, drainage, fatigue, or any other symptoms.  Objectively, the radiation area was evaluated with regards to erythema, atrophy, scale, crusting, erosion, ulceration, edema, purpura, tenderness, warmth, drainage, and any other findings.  The plan was extensively reviewed including the dose, and dosing schedule.  The simulation and clinical setup was also reviewed as was the external and any internal shields and based on this review the appropriateness and sufficiency of treatment was determined.
Assessment: Appropriate reaction
Detail Level: Detailed
Treatment Time / Fractionation (Optional- Include Units): 0.43 min
Fractions / Week Rx 4: 5
Field Size (Applicator): 2.5 cm
Include Rx 2 When Rendering Additional Prescriptions: No
Dose Per Fractionation In Cgy (Optional): 270.45
Initial Radiation Treatment Planning (Will Render If Bill Simulation = Yes): The patient had a complete consultation regarding all applicable modalities for the treatment of their skin cancer and based on a variety of factors including the type of tumor, size, and location, the relevant medical history as well as local tissue factors, the functional status of the individual, the ability to perform necessary postoperative wound instructions and the need for simultaneous treatments as well as overall wound healing status, it was determined that the patient would begin radiation therapy treatment for skin cancer.  A full simulation and treatment device design was performed including the determination and formulation of appropriate simple and complex devices including lead shield of 0.762 mm thickness to form molded customized shielding to specifically correlate with the lesion size including treatment margin.  The custom lead shield is adequate to accommodate the appropriate applicator and provide adequate shielding around the treatment site.  The specific field applicator, shields, and devices both simple and complex as well as the specific patient setup is outlined below.  The patient was given a full consent for superficial radiation to both verbally and in writing and the full determination of patient's eligibility for treatment and selection is outlined on the patient eligibility and treatment selection form.  The specific superficial radiotherapy prescription was determined and was documented on the superficial radiotherapy prescription form.  A treatment calculation was also performed and documented on the treatment calculation form.  Based on the prescription, the patient was scheduled for a series of fractional treatments.
Treatment Device Design After Initial Simulation Justification (Will Render If Bill For Treatment Devices = Yes): The patient is status post radiation simulation and is evaluated as to the use of additional devices for shielding and placement for radiation therapy.
Total Number Of Fractions Rx 2: 15
Field Number: 2
Dose / Tx In Cgy (Optional): 270.90
Port Dimensions-Y Axis In Cm: 0.8
Include Rx 3 When Rendering Additional Prescriptions: Yes
Custom Shielding Preamble Text Will Not Be Included With Simple Simulations (.......... X X Y Cm): A lead shield of 0.762 mm thickness is utilized to form a molded, custom shield with a
Pathology Override (Pathology Will Render As Diagnosis Name If Left Blank): BCC nodular
Day Of The Week Treatment Administered: Tuesday
Patient Positioning: Sitting
Custom Shielding Afterword Text Will Not Be Included With Simple Simulations (X X Y Cm............): port to correlate with the lesion size, including treatment margin. The custom lead shield is adequate to accommodate the appropriate applicator and provide adequate shielding around the treatment site. Additional shielding (as noted below) is used to protect sensitive, normal tissues.
Treatment Margins In Cm: 0.5
Cumulative Dose In Cgy (Optional): 3521.7
Functional Status: 1 (ambulatory, light activity)
Simple Simulation Preamble Text Will Be Included With Simple Simulations (.......... Indications): Simple simulation was performed today for the following reasons:
Shielding Size (Optional- Include Units): 2.0 cm
Dose Per Fractionation In Cgy (Optional): 270.9
Treatment Time In Min (Optional): 0.43
Fractionation Number: 13
Daily Fractionated Dose (Optional- Include Units): 270.45 cGy
Energy (Include Units): 70kV
Energy (Optional-Please Include Units): 70 KV
Information: Selecting Yes will display possible errors in your note based on the variables you have selected. This validation is only offered as a suggestion for you. PLEASE NOTE THAT THE VALIDATION TEXT WILL BE REMOVED WHEN YOU FINALIZE YOUR NOTE. IF YOU WANT TO FAX A PRELIMINARY NOTE YOU WILL NEED TO TOGGLE THIS TO 'NO' IF YOU DO NOT WANT IT IN YOUR FAXED NOTE.
Comments: On [employer], RTOG 1
Time Dose Fractionation (Optional- Include Units If Applicable): 96
Total Dose (Optional-Please Include Units): 5409.0
Energy (Optional-Please Include Units): 70
Treatment Time / Fractionation (Optional- Include Units): 0.45 min
Depth (Optional-Please Include Units): 0.97 mm
Daily Fractionated Dose (Optional- Include Units): 270.90 cGy
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
Total Dose (Optional-Please Include Units): 5418.0
Depth (Optional-Please Include Units): .85 mm
Cumulative Dose In Cgy (Optional): 3515.85
Fractionation Number (Evaluation): 10

## 2021-03-02 NOTE — PROCEDURE: TREATMENT REGIMEN
Detail Level: Zone
Plan: Treatment with u/s\\n\\nThis patient has been treated today with image guided superficial radiation therapy for non-melanoma\\nskin cancer.\\n\\nWritten informed consent has been previously obtained from this patient for this treatment. This\\nconsent is documented in the patient’s chart. The patient gave verbal consent to continue treatment\\ntoday.\\n\\nThe patient was treated with a specific radiation dose and setup as prescribed by the provider listed on\\nthis visit note. A Radiation Therapist performed administration of radiation under supervision of\\nprovider. The treatment parameters and cumulative dose are indicated above.\\nPrior to administering the radiation, the patient underwent a verification therapeutic radiology\\nsimulation-aided field setting defining relevant normal and abnormal target anatomy and acquiring\\nimages with high frequency ultrasound in addition to data necessary developing optimal radiation\\ntreatment process for the patient. This process includes verification of the treatment port(s) and proper\\ntreatment positioning. All treatment ports were photographed within electronic medical record. The\\npatient’s customized lead blocking along with gross tumor volume and margin was\\nconfirmed. Considering superficial radiotherapy is clinical in setup, this requires physician and\\nradiation therapist to clarify location interest being treated against initial images, pathology and\\npatient anatomy. Care was taken ensuring fields treated were geometrically accurate and properly\\npositioned using therapeutic radiology simulation-aided field setting verification per fraction. This\\nprocess is also utilized to determine if any prescription or setup changes are necessary. These steps are,\\ntherefore, medically necessary ensuring safe and effective administration of radiation. Ongoing\\ntherapeutic radiology simulation-aided field setting verification is ordered throughout course of\\ntherapy.\\n\\nA high frequency ultrasound image was acquired today for two-dimensional evaluation of the tumor\\nvolume and response to treatment, in addition to geometric accuracy of field placement. US depth Is\\n1.27 mm, which is +/- 0.00 mm in difference from previous imaging. The field placement and\\nultrasound imaging, per fraction, is separate and distinct from the initial simulation, and is an\\nimportant task in providing safe administration of superficial radiation therapy. Physician evaluation of\\nthe ultrasound tumor depth will be ongoing throughout the course of treatment, and is deemed\\nmedically necessary in order to ensure the efficacy of treatment and any necessary changes. Today’s\\nimage was evaluated for determination of continuation of treatment with the current plan or with\\nnecessary changes as appropriate. According to provider review of verification therapeutic radiology\\nsimulation-aided field setting and imaging, no change is required. Additionally, the use of ultrasound visualization \\nand targeted assessment allows the patient to be able to see their cancer(s) progress, encouraging patient to\\ncomplete and maintain compliance through full course of radiotherapy.\\n\\nPer Dr. Suarez, continued ultrasound guidance and therapeutic radiology simulation-aided field setting\\nverification per fraction is required for field placement, measurement of tumor depth, progress and\\nacute effect monitoring.\\n
Plan: Treatment with u/s\\n\\nThis patient has been treated today with image guided superficial radiation therapy for non-melanoma\\nskin cancer.\\n\\nWritten informed consent has been previously obtained from this patient for this treatment. This\\nconsent is documented in the patient’s chart. The patient gave verbal consent to continue treatment\\ntoday.\\n\\nThe patient was treated with a specific radiation dose and setup as prescribed by the provider listed on\\nthis visit note. A Radiation Therapist performed administration of radiation under supervision of\\nprovider. The treatment parameters and cumulative dose are indicated above.\\nPrior to administering the radiation, the patient underwent a verification therapeutic radiology\\nsimulation-aided field setting defining relevant normal and abnormal target anatomy and acquiring\\nimages with high frequency ultrasound in addition to data necessary developing optimal radiation\\ntreatment process for the patient. This process includes verification of the treatment port(s) and proper\\ntreatment positioning. All treatment ports were photographed within electronic medical record. The\\npatient’s customized lead blocking along with gross tumor volume and margin was\\nconfirmed. Considering superficial radiotherapy is clinical in setup, this requires physician and\\nradiation therapist to clarify location interest being treated against initial images, pathology and\\npatient anatomy. Care was taken ensuring fields treated were geometrically accurate and properly\\npositioned using therapeutic radiology simulation-aided field setting verification per fraction. This\\nprocess is also utilized to determine if any prescription or setup changes are necessary. These steps are,\\ntherefore, medically necessary ensuring safe and effective administration of radiation. Ongoing\\ntherapeutic radiology simulation-aided field setting verification is ordered throughout course of\\ntherapy.\\n\\nA high frequency ultrasound image was acquired today for two-dimensional evaluation of the tumor\\nvolume and response to treatment, in addition to geometric accuracy of field placement. US depth Is\\n0.99 mm, which is +/- 0.12 mm in difference from previous imaging. The field placement and\\nultrasound imaging, per fraction, is separate and distinct from the initial simulation, and is an\\nimportant task in providing safe administration of superficial radiation therapy. Physician evaluation of\\nthe ultrasound tumor depth will be ongoing throughout the course of treatment, and is deemed\\nmedically necessary in order to ensure the efficacy of treatment and any necessary changes. Today’s\\nimage was evaluated for determination of continuation of treatment with the current plan or with\\nnecessary changes as appropriate. According to provider review of verification therapeutic radiology\\nsimulation-aided field setting and imaging, no change is required. Additionally, the use of ultrasound visualization \\nand targeted assessment allows the patient to be able to see their cancer(s) progress, encouraging patient to\\ncomplete and maintain compliance through full course of radiotherapy.\\n\\nPer Dr. Suarez, continued ultrasound guidance and therapeutic radiology simulation-aided field setting\\nverification per fraction is required for field placement, measurement of tumor depth, progress and\\nacute effect monitoring.\\n

## 2021-03-04 ENCOUNTER — APPOINTMENT (OUTPATIENT)
Age: 76
Setting detail: DERMATOLOGY
End: 2021-03-06

## 2021-03-04 PROBLEM — C44.311 BASAL CELL CARCINOMA OF SKIN OF NOSE: Status: ACTIVE | Noted: 2021-03-04

## 2021-03-04 PROBLEM — C44.329 SQUAMOUS CELL CARCINOMA OF SKIN OF OTHER PARTS OF FACE: Status: ACTIVE | Noted: 2021-03-04

## 2021-03-04 PROCEDURE — G6001 ECHO GUIDANCE RADIOTHERAPY: HCPCS

## 2021-03-04 PROCEDURE — OTHER TREATMENT REGIMEN: OTHER

## 2021-03-04 PROCEDURE — 77401 RADIATION TX DELIVERY SUPFC: CPT

## 2021-03-04 PROCEDURE — OTHER SUPERFICIAL RADIATION TREATMENT: OTHER

## 2021-03-04 PROCEDURE — 77280 THER RAD SIMULAJ FIELD SMPL: CPT

## 2021-03-04 PROCEDURE — OTHER FOLLOW UP FOR NEXT VISIT: OTHER

## 2021-03-04 NOTE — PROCEDURE: TREATMENT REGIMEN
Plan: Treatment with u/s\\n\\nThis patient has been treated today with image guided superficial radiation therapy for non-melanoma\\nskin cancer.\\n\\nWritten informed consent has been previously obtained from this patient for this treatment. This\\nconsent is documented in the patient’s chart. The patient gave verbal consent to continue treatment\\ntoday.\\n\\nThe patient was treated with a specific radiation dose and setup as prescribed by the provider listed on\\nthis visit note. A Radiation Therapist performed administration of radiation under supervision of\\nprovider. The treatment parameters and cumulative dose are indicated above.\\nPrior to administering the radiation, the patient underwent a verification therapeutic radiology\\nsimulation-aided field setting defining relevant normal and abnormal target anatomy and acquiring\\nimages with high frequency ultrasound in addition to data necessary developing optimal radiation\\ntreatment process for the patient. This process includes verification of the treatment port(s) and proper\\ntreatment positioning. All treatment ports were photographed within electronic medical record. The\\npatient’s customized lead blocking along with gross tumor volume and margin was\\nconfirmed. Considering superficial radiotherapy is clinical in setup, this requires physician and\\nradiation therapist to clarify location interest being treated against initial images, pathology and\\npatient anatomy. Care was taken ensuring fields treated were geometrically accurate and properly\\npositioned using therapeutic radiology simulation-aided field setting verification per fraction. This\\nprocess is also utilized to determine if any prescription or setup changes are necessary. These steps are,\\ntherefore, medically necessary ensuring safe and effective administration of radiation. Ongoing\\ntherapeutic radiology simulation-aided field setting verification is ordered throughout course of\\ntherapy.\\n\\nA high frequency ultrasound image was acquired today for two-dimensional evaluation of the tumor\\nvolume and response to treatment, in addition to geometric accuracy of field placement. US depth Is\\n0.96 mm, which is +/- 0.03 mm in difference from previous imaging. The field placement and\\nultrasound imaging, per fraction, is separate and distinct from the initial simulation, and is an\\nimportant task in providing safe administration of superficial radiation therapy. Physician evaluation of\\nthe ultrasound tumor depth will be ongoing throughout the course of treatment, and is deemed\\nmedically necessary in order to ensure the efficacy of treatment and any necessary changes. Today’s\\nimage was evaluated for determination of continuation of treatment with the current plan or with\\nnecessary changes as appropriate. According to provider review of verification therapeutic radiology\\nsimulation-aided field setting and imaging, no change is required. Additionally, the use of ultrasound visualization \\nand targeted assessment allows the patient to be able to see their cancer(s) progress, encouraging patient to\\ncomplete and maintain compliance through full course of radiotherapy.\\n\\nPer Dr. Suarez, continued ultrasound guidance and therapeutic radiology simulation-aided field setting\\nverification per fraction is required for field placement, measurement of tumor depth, progress and\\nacute effect monitoring.\\n
Detail Level: Zone
Plan: Treatment with u/s\\n\\nThis patient has been treated today with image guided superficial radiation therapy for non-melanoma\\nskin cancer.\\n\\nWritten informed consent has been previously obtained from this patient for this treatment. This\\nconsent is documented in the patient’s chart. The patient gave verbal consent to continue treatment\\ntoday.\\n\\nThe patient was treated with a specific radiation dose and setup as prescribed by the provider listed on\\nthis visit note. A Radiation Therapist performed administration of radiation under supervision of\\nprovider. The treatment parameters and cumulative dose are indicated above.\\nPrior to administering the radiation, the patient underwent a verification therapeutic radiology\\nsimulation-aided field setting defining relevant normal and abnormal target anatomy and acquiring\\nimages with high frequency ultrasound in addition to data necessary developing optimal radiation\\ntreatment process for the patient. This process includes verification of the treatment port(s) and proper\\ntreatment positioning. All treatment ports were photographed within electronic medical record. The\\npatient’s customized lead blocking along with gross tumor volume and margin was\\nconfirmed. Considering superficial radiotherapy is clinical in setup, this requires physician and\\nradiation therapist to clarify location interest being treated against initial images, pathology and\\npatient anatomy. Care was taken ensuring fields treated were geometrically accurate and properly\\npositioned using therapeutic radiology simulation-aided field setting verification per fraction. This\\nprocess is also utilized to determine if any prescription or setup changes are necessary. These steps are,\\ntherefore, medically necessary ensuring safe and effective administration of radiation. Ongoing\\ntherapeutic radiology simulation-aided field setting verification is ordered throughout course of\\ntherapy.\\n\\nA high frequency ultrasound image was acquired today for two-dimensional evaluation of the tumor\\nvolume and response to treatment, in addition to geometric accuracy of field placement. US depth Is\\n1.25 mm, which is +/- 0.02 mm in difference from previous imaging. The field placement and\\nultrasound imaging, per fraction, is separate and distinct from the initial simulation, and is an\\nimportant task in providing safe administration of superficial radiation therapy. Physician evaluation of\\nthe ultrasound tumor depth will be ongoing throughout the course of treatment, and is deemed\\nmedically necessary in order to ensure the efficacy of treatment and any necessary changes. Today’s\\nimage was evaluated for determination of continuation of treatment with the current plan or with\\nnecessary changes as appropriate. According to provider review of verification therapeutic radiology\\nsimulation-aided field setting and imaging, no change is required. Additionally, the use of ultrasound visualization \\nand targeted assessment allows the patient to be able to see their cancer(s) progress, encouraging patient to\\ncomplete and maintain compliance through full course of radiotherapy.\\n\\nPer Dr. Suarez, continued ultrasound guidance and therapeutic radiology simulation-aided field setting\\nverification per fraction is required for field placement, measurement of tumor depth, progress and\\nacute effect monitoring.\\n

## 2021-03-04 NOTE — PROCEDURE: SUPERFICIAL RADIATION TREATMENT
Render Additional Prescriptions In Note?: No
Dimensions-Y Axis In Cm: 0.8
Patient Positioning: Sitting
Simple Simulation Afterword Text Will Be Included With Simple Simulations (Indications............): The patient had a complete consultation regarding all applicable modalities for the treatment of their skin cancer and based on a variety of factors including the type of tumor, size, and location, the relevant medical history as well as local tissue factors, the functional status of the individual, the ability to perform necessary postoperative wound instructions and the need for simultaneous treatments as well as overall wound healing status, it was determined that the patient would begin radiation therapy treatment for skin cancer.  A full simulation and treatment device design was performed including the determination and formulation of appropriate simple and complex devices including lead shield of 0.762 mm thickness to form molded customized shielding to specifically correlate with the lesion size including treatment margin.  The custom lead shield is adequate to accommodate the appropriate applicator and provide adequate shielding around the treatment site.  The specific field applicator, shields, and devices both simple and complex as well as the specific patient setup is outlined below.  The patient was given a full consent for superficial radiation to both verbally and in writing and the full determination of patient's eligibility for treatment and selection is outlined on the patient eligibility and treatment selection form.  The specific superficial radiotherapy prescription was determined and was documented on the superficial radiotherapy prescription form.  A treatment calculation was also performed and documented on the treatment calculation form.  Based on the prescription, the patient was scheduled for a series of fractional treatments.
Energy (Optional-Please Include Units): 70 KV
Intro Statement (Will Not Render If Left Blank): The patient is undergoing superficial radiation therapy for skin cancer and presents for weekly evaluation and management.  Per protocol and as documented on the flow sheet, the patient was questioned as to subjective redness, pruritus, pain, drainage, fatigue, or any other symptoms.  Objectively, the radiation area was evaluated with regards to erythema, atrophy, scale, crusting, erosion, ulceration, edema, purpura, tenderness, warmth, drainage, and any other findings.  The plan was extensively reviewed including the dose, and dosing schedule.  The simulation and clinical setup was also reviewed as was the external and any internal shields and based on this review the appropriateness and sufficiency of treatment was determined.
Number Of Treatment Days: 1
Include Rx 2 When Rendering Additional Prescriptions: Yes
Comments: On Target, RTOG 1
Functional Status: 1 (ambulatory, light activity)
Field Size (Applicator): 2.5 cm
Time Dose Fractionation (Optional- Include Units If Applicable): 96
Detail Level: Detailed
Port Dimensions-X Axis In Cm: 2
Cumulative Dose In Cgy (Optional): 3786.30
Depth (Optional-Please Include Units): .85 mm
Computed Treatment Time In Min (Will Render The Same As Calculated Treatment Time If Left Blank): 0.45 min
Computed Treatment Time In Min (Will Render The Same As Calculated Treatment Time If Left Blank): 0.43 min
Total Number Of Fractions: 20
Fractionation Number: 14
Treatment Device Design After Initial Simulation Justification (Will Render If Bill For Treatment Devices = Yes): The patient is status post radiation simulation and is evaluated as to the use of additional devices for shielding and placement for radiation therapy.
Dose Per Fractionation In Cgy (Optional): 270.45
Assessment: Appropriate reaction
Total Number Of Fractions Rx 3: 15
Daily Fractionated Dose (Optional- Include Units): 270.90 cGy
Fractions / Week Rx 2: 5
Fractions / Week: 3
Pathology Override (Pathology Will Render As Diagnosis Name If Left Blank): BCC nodular
Treatment Time In Min (Optional): 0.43
Dose Per Fractionation In Cgy (Optional): 270.9
Day Of The Week Treatment Administered: Thursday
Total Dose (Optional-Please Include Units): 5418.0
Custom Shielding Preamble Text Will Not Be Included With Simple Simulations (.......... X X Y Cm): A lead shield of 0.762 mm thickness is utilized to form a molded, custom shield with a
Fractionation Number (Evaluation): 10
Custom Shielding Afterword Text Will Not Be Included With Simple Simulations (X X Y Cm............): port to correlate with the lesion size, including treatment margin. The custom lead shield is adequate to accommodate the appropriate applicator and provide adequate shielding around the treatment site. Additional shielding (as noted below) is used to protect sensitive, normal tissues.
Simple Simulation Preamble Text Will Be Included With Simple Simulations (.......... Indications): Simple simulation was performed today for the following reasons:
Energy (Include Units): 70kV
Dose / Tx In Cgy (Optional): 270.90
Information: Selecting Yes will display possible errors in your note based on the variables you have selected. This validation is only offered as a suggestion for you. PLEASE NOTE THAT THE VALIDATION TEXT WILL BE REMOVED WHEN YOU FINALIZE YOUR NOTE. IF YOU WANT TO FAX A PRELIMINARY NOTE YOU WILL NEED TO TOGGLE THIS TO 'NO' IF YOU DO NOT WANT IT IN YOUR FAXED NOTE.
Field Size (Applicator): 2.0 cm
Depth (Optional-Please Include Units): 0.97 mm
Shielding Size (Optional- Include Units): none
Treatment Margins In Cm: 0.35
Treatment Time In Min (Optional): 0.45
Field Size (Applicator): 1.5 cm
Daily Fractionated Dose (Optional- Include Units): 270.45 cGy
Treatment Margins In Cm: 0.5
Total Dose (Optional-Please Include Units): 5409.0
Cumulative Dose In Cgy (Optional): 3792.60
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
Energy (Optional-Please Include Units): 70

## 2021-03-09 ENCOUNTER — APPOINTMENT (OUTPATIENT)
Age: 76
Setting detail: DERMATOLOGY
End: 2021-03-12

## 2021-03-09 VITALS — TEMPERATURE: 98 F

## 2021-03-09 PROBLEM — C44.329 SQUAMOUS CELL CARCINOMA OF SKIN OF OTHER PARTS OF FACE: Status: ACTIVE | Noted: 2021-03-09

## 2021-03-09 PROBLEM — C44.311 BASAL CELL CARCINOMA OF SKIN OF NOSE: Status: ACTIVE | Noted: 2021-03-09

## 2021-03-09 PROCEDURE — G6001 ECHO GUIDANCE RADIOTHERAPY: HCPCS

## 2021-03-09 PROCEDURE — OTHER SUPERFICIAL RADIATION TREATMENT: OTHER

## 2021-03-09 PROCEDURE — 77427 RADIATION TX MANAGEMENT X5: CPT

## 2021-03-09 PROCEDURE — 77401 RADIATION TX DELIVERY SUPFC: CPT

## 2021-03-09 PROCEDURE — OTHER TREATMENT REGIMEN: OTHER

## 2021-03-09 PROCEDURE — OTHER FOLLOW UP FOR NEXT VISIT: OTHER

## 2021-03-09 PROCEDURE — 77280 THER RAD SIMULAJ FIELD SMPL: CPT

## 2021-03-09 NOTE — PROCEDURE: TREATMENT REGIMEN
Plan: Treatment with u/s\\n\\nThis patient has been treated today with image guided superficial radiation therapy for non-melanoma\\nskin cancer.\\n\\nWritten informed consent has been previously obtained from this patient for this treatment. This\\nconsent is documented in the patient’s chart. The patient gave verbal consent to continue treatment\\ntoday.\\n\\nThe patient was treated with a specific radiation dose and setup as prescribed by the provider listed on\\nthis visit note. A Radiation Therapist performed administration of radiation under supervision of\\nprovider. The treatment parameters and cumulative dose are indicated above.\\nPrior to administering the radiation, the patient underwent a verification therapeutic radiology\\nsimulation-aided field setting defining relevant normal and abnormal target anatomy and acquiring\\nimages with high frequency ultrasound in addition to data necessary developing optimal radiation\\ntreatment process for the patient. This process includes verification of the treatment port(s) and proper\\ntreatment positioning. All treatment ports were photographed within electronic medical record. The\\npatient’s customized lead blocking along with gross tumor volume and margin was\\nconfirmed. Considering superficial radiotherapy is clinical in setup, this requires physician and\\nradiation therapist to clarify location interest being treated against initial images, pathology and\\npatient anatomy. Care was taken ensuring fields treated were geometrically accurate and properly\\npositioned using therapeutic radiology simulation-aided field setting verification per fraction. This\\nprocess is also utilized to determine if any prescription or setup changes are necessary. These steps are,\\ntherefore, medically necessary ensuring safe and effective administration of radiation. Ongoing\\ntherapeutic radiology simulation-aided field setting verification is ordered throughout course of\\ntherapy.\\n\\nA high frequency ultrasound image was acquired today for two-dimensional evaluation of the tumor\\nvolume and response to treatment, in addition to geometric accuracy of field placement. US depth Is\\n1.36 mm, which is +/- 0.40 mm in difference from previous imaging. The field placement and\\nultrasound imaging, per fraction, is separate and distinct from the initial simulation, and is an\\nimportant task in providing safe administration of superficial radiation therapy. Physician evaluation of\\nthe ultrasound tumor depth will be ongoing throughout the course of treatment, and is deemed\\nmedically necessary in order to ensure the efficacy of treatment and any necessary changes. Today’s\\nimage was evaluated for determination of continuation of treatment with the current plan or with\\nnecessary changes as appropriate. According to provider review of verification therapeutic radiology\\nsimulation-aided field setting and imaging, no change is required. Additionally, the use of ultrasound visualization \\nand targeted assessment allows the patient to be able to see their cancer(s) progress, encouraging patient to\\ncomplete and maintain compliance through full course of radiotherapy.\\n\\nPer Dr. Suarez, continued ultrasound guidance and therapeutic radiology simulation-aided field setting\\nverification per fraction is required for field placement, measurement of tumor depth, progress and\\nacute effect monitoring.\\n
Detail Level: Zone
Plan: Treatment with u/s\\n\\nThis patient has been treated today with image guided superficial radiation therapy for non-melanoma\\nskin cancer.\\n\\nWritten informed consent has been previously obtained from this patient for this treatment. This\\nconsent is documented in the patient’s chart. The patient gave verbal consent to continue treatment\\ntoday.\\n\\nThe patient was treated with a specific radiation dose and setup as prescribed by the provider listed on\\nthis visit note. A Radiation Therapist performed administration of radiation under supervision of\\nprovider. The treatment parameters and cumulative dose are indicated above.\\nPrior to administering the radiation, the patient underwent a verification therapeutic radiology\\nsimulation-aided field setting defining relevant normal and abnormal target anatomy and acquiring\\nimages with high frequency ultrasound in addition to data necessary developing optimal radiation\\ntreatment process for the patient. This process includes verification of the treatment port(s) and proper\\ntreatment positioning. All treatment ports were photographed within electronic medical record. The\\npatient’s customized lead blocking along with gross tumor volume and margin was\\nconfirmed. Considering superficial radiotherapy is clinical in setup, this requires physician and\\nradiation therapist to clarify location interest being treated against initial images, pathology and\\npatient anatomy. Care was taken ensuring fields treated were geometrically accurate and properly\\npositioned using therapeutic radiology simulation-aided field setting verification per fraction. This\\nprocess is also utilized to determine if any prescription or setup changes are necessary. These steps are,\\ntherefore, medically necessary ensuring safe and effective administration of radiation. Ongoing\\ntherapeutic radiology simulation-aided field setting verification is ordered throughout course of\\ntherapy.\\n\\nA high frequency ultrasound image was acquired today for two-dimensional evaluation of the tumor\\nvolume and response to treatment, in addition to geometric accuracy of field placement. US depth Is\\n1.31 mm, which is +/- 0.06 mm in difference from previous imaging. The field placement and\\nultrasound imaging, per fraction, is separate and distinct from the initial simulation, and is an\\nimportant task in providing safe administration of superficial radiation therapy. Physician evaluation of\\nthe ultrasound tumor depth will be ongoing throughout the course of treatment, and is deemed\\nmedically necessary in order to ensure the efficacy of treatment and any necessary changes. Today’s\\nimage was evaluated for determination of continuation of treatment with the current plan or with\\nnecessary changes as appropriate. According to provider review of verification therapeutic radiology\\nsimulation-aided field setting and imaging, no change is required. Additionally, the use of ultrasound visualization \\nand targeted assessment allows the patient to be able to see their cancer(s) progress, encouraging patient to\\ncomplete and maintain compliance through full course of radiotherapy.\\n\\nPer Dr. Suarez, continued ultrasound guidance and therapeutic radiology simulation-aided field setting\\nverification per fraction is required for field placement, measurement of tumor depth, progress and\\nacute effect monitoring.\\n

## 2021-03-09 NOTE — PROCEDURE: SUPERFICIAL RADIATION TREATMENT
Jarred For Simulation Without Treatment Device Design (Simple Simulation): No
Port Dimensions-Y Axis In Cm: 2
Dose / Tx In Cgy (Optional): 270.90
Treatment Margins In Cm: 0.35
Total Dose (Optional-Please Include Units): 5418.0
Include Rx 2 When Rendering Additional Prescriptions: Yes
Dimensions-X Axis In Cm: 1
Depth (Optional-Please Include Units): .85 mm
Cumulative Dose In Cgy (Optional): 4056.75
Functional Status: 1 (ambulatory, light activity)
Day Of The Week Treatment Administered: Tuesday
Energy (Optional-Please Include Units): 70 KV
Energy (Include Units): 70kV
Field Size (Applicator): 1.5 cm
Daily Fractionated Dose (Optional- Include Units): 270.45 cGy
Treatment Margins In Cm: 0.5
Total Number Of Fractions: 20
Port Dimensions-X Axis In Cm: 0.8
Treatment Time In Min (Optional): 0.45
Detail Level: Detailed
Comments: On Target, RTOG 1
Treatment Time In Min (Optional): 0.43
Daily Fractionated Dose (Optional- Include Units): 270.90 cGy
Computed Treatment Time In Min (Will Render The Same As Calculated Treatment Time If Left Blank): 0.45 min
Computed Treatment Time In Min (Will Render The Same As Calculated Treatment Time If Left Blank): 0.43 min
Total Number Of Fractions Rx 4: 15
Initial Radiation Treatment Planning (Will Render If Bill Simulation = Yes): The patient had a complete consultation regarding all applicable modalities for the treatment of their skin cancer and based on a variety of factors including the type of tumor, size, and location, the relevant medical history as well as local tissue factors, the functional status of the individual, the ability to perform necessary postoperative wound instructions and the need for simultaneous treatments as well as overall wound healing status, it was determined that the patient would begin radiation therapy treatment for skin cancer.  A full simulation and treatment device design was performed including the determination and formulation of appropriate simple and complex devices including lead shield of 0.762 mm thickness to form molded customized shielding to specifically correlate with the lesion size including treatment margin.  The custom lead shield is adequate to accommodate the appropriate applicator and provide adequate shielding around the treatment site.  The specific field applicator, shields, and devices both simple and complex as well as the specific patient setup is outlined below.  The patient was given a full consent for superficial radiation to both verbally and in writing and the full determination of patient's eligibility for treatment and selection is outlined on the patient eligibility and treatment selection form.  The specific superficial radiotherapy prescription was determined and was documented on the superficial radiotherapy prescription form.  A treatment calculation was also performed and documented on the treatment calculation form.  Based on the prescription, the patient was scheduled for a series of fractional treatments.
Time Dose Fractionation (Optional- Include Units If Applicable): 96
Custom Shielding Preamble Text Will Not Be Included With Simple Simulations (.......... X X Y Cm): A lead shield of 0.762 mm thickness is utilized to form a molded, custom shield with a
Dose Per Fractionation In Cgy (Optional): 270.9
Shielding Size (Optional- Include Units): 2.0 cm
Field Size (Applicator): 2.5 cm
Pathology Override (Pathology Will Render As Diagnosis Name If Left Blank): BCC nodular
Fractions / Week Rx 4: 5
Intro Statement (Will Not Render If Left Blank): The patient is undergoing superficial radiation therapy for skin cancer and presents for weekly evaluation and management.  Per protocol and as documented on the flow sheet, the patient was questioned as to subjective redness, pruritus, pain, drainage, fatigue, or any other symptoms.  Objectively, the radiation area was evaluated with regards to erythema, atrophy, scale, crusting, erosion, ulceration, edema, purpura, tenderness, warmth, drainage, and any other findings.  The plan was extensively reviewed including the dose, and dosing schedule.  The simulation and clinical setup was also reviewed as was the external and any internal shields and based on this review the appropriateness and sufficiency of treatment was determined.
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
Patient Positioning: Sitting
Assessment: Appropriate reaction
Simple Simulation Preamble Text Will Be Included With Simple Simulations (.......... Indications): Simple simulation was performed today for the following reasons:
Dose / Tx In Cgy (Optional): 270.45
Custom Shielding Afterword Text Will Not Be Included With Simple Simulations (X X Y Cm............): port to correlate with the lesion size, including treatment margin. The custom lead shield is adequate to accommodate the appropriate applicator and provide adequate shielding around the treatment site. Additional shielding (as noted below) is used to protect sensitive, normal tissues.
Shielding Size (Optional- Include Units): none
Depth (Optional-Please Include Units): 0.97 mm
Cumulative Dose In Cgy (Optional): 4063.59
Fractions / Week: 3
Information: Selecting Yes will display possible errors in your note based on the variables you have selected. This validation is only offered as a suggestion for you. PLEASE NOTE THAT THE VALIDATION TEXT WILL BE REMOVED WHEN YOU FINALIZE YOUR NOTE. IF YOU WANT TO FAX A PRELIMINARY NOTE YOU WILL NEED TO TOGGLE THIS TO 'NO' IF YOU DO NOT WANT IT IN YOUR FAXED NOTE.
Total Dose (Optional-Please Include Units): 5409.0
Energy (Optional-Please Include Units): 70
Treatment Device Design After Initial Simulation Justification (Will Render If Bill For Treatment Devices = Yes): The patient is status post radiation simulation and is evaluated as to the use of additional devices for shielding and placement for radiation therapy.

## 2021-03-11 ENCOUNTER — APPOINTMENT (OUTPATIENT)
Age: 76
Setting detail: DERMATOLOGY
End: 2021-03-12

## 2021-03-11 VITALS — TEMPERATURE: 79.9 F

## 2021-03-11 PROBLEM — C44.311 BASAL CELL CARCINOMA OF SKIN OF NOSE: Status: ACTIVE | Noted: 2021-03-11

## 2021-03-11 PROBLEM — C44.329 SQUAMOUS CELL CARCINOMA OF SKIN OF OTHER PARTS OF FACE: Status: ACTIVE | Noted: 2021-03-11

## 2021-03-11 PROCEDURE — 77401 RADIATION TX DELIVERY SUPFC: CPT

## 2021-03-11 PROCEDURE — OTHER TREATMENT REGIMEN: OTHER

## 2021-03-11 PROCEDURE — 77280 THER RAD SIMULAJ FIELD SMPL: CPT

## 2021-03-11 PROCEDURE — OTHER FOLLOW UP FOR NEXT VISIT: OTHER

## 2021-03-11 PROCEDURE — G6001 ECHO GUIDANCE RADIOTHERAPY: HCPCS

## 2021-03-11 PROCEDURE — OTHER SUPERFICIAL RADIATION TREATMENT: OTHER

## 2021-03-11 NOTE — PROCEDURE: TREATMENT REGIMEN
Detail Level: Zone
Plan: Treatment with u/s\\n\\nThis patient has been treated today with image guided superficial radiation therapy for non-melanoma\\nskin cancer.\\n\\nWritten informed consent has been previously obtained from this patient for this treatment. This\\nconsent is documented in the patient’s chart. The patient gave verbal consent to continue treatment\\ntoday.\\n\\nThe patient was treated with a specific radiation dose and setup as prescribed by the provider listed on\\nthis visit note. A Radiation Therapist performed administration of radiation under supervision of\\nprovider. The treatment parameters and cumulative dose are indicated above.\\nPrior to administering the radiation, the patient underwent a verification therapeutic radiology\\nsimulation-aided field setting defining relevant normal and abnormal target anatomy and acquiring\\nimages with high frequency ultrasound in addition to data necessary developing optimal radiation\\ntreatment process for the patient. This process includes verification of the treatment port(s) and proper\\ntreatment positioning. All treatment ports were photographed within electronic medical record. The\\npatient’s customized lead blocking along with gross tumor volume and margin was\\nconfirmed. Considering superficial radiotherapy is clinical in setup, this requires physician and\\nradiation therapist to clarify location interest being treated against initial images, pathology and\\npatient anatomy. Care was taken ensuring fields treated were geometrically accurate and properly\\npositioned using therapeutic radiology simulation-aided field setting verification per fraction. This\\nprocess is also utilized to determine if any prescription or setup changes are necessary. These steps are,\\ntherefore, medically necessary ensuring safe and effective administration of radiation. Ongoing\\ntherapeutic radiology simulation-aided field setting verification is ordered throughout course of\\ntherapy.\\n\\nA high frequency ultrasound image was acquired today for two-dimensional evaluation of the tumor\\nvolume and response to treatment, in addition to geometric accuracy of field placement. US depth Is\\n1.49 mm, which is +/- 0.18 mm in difference from previous imaging. The field placement and\\nultrasound imaging, per fraction, is separate and distinct from the initial simulation, and is an\\nimportant task in providing safe administration of superficial radiation therapy. Physician evaluation of\\nthe ultrasound tumor depth will be ongoing throughout the course of treatment, and is deemed\\nmedically necessary in order to ensure the efficacy of treatment and any necessary changes. Today’s\\nimage was evaluated for determination of continuation of treatment with the current plan or with\\nnecessary changes as appropriate. According to provider review of verification therapeutic radiology\\nsimulation-aided field setting and imaging, no change is required. Additionally, the use of ultrasound visualization \\nand targeted assessment allows the patient to be able to see their cancer(s) progress, encouraging patient to\\ncomplete and maintain compliance through full course of radiotherapy.\\n\\nPer Dr. Suarez, continued ultrasound guidance and therapeutic radiology simulation-aided field setting\\nverification per fraction is required for field placement, measurement of tumor depth, progress and\\nacute effect monitoring.\\n
Plan: Treatment with u/s\\n\\nThis patient has been treated today with image guided superficial radiation therapy for non-melanoma\\nskin cancer.\\n\\nWritten informed consent has been previously obtained from this patient for this treatment. This\\nconsent is documented in the patient’s chart. The patient gave verbal consent to continue treatment\\ntoday.\\n\\nThe patient was treated with a specific radiation dose and setup as prescribed by the provider listed on\\nthis visit note. A Radiation Therapist performed administration of radiation under supervision of\\nprovider. The treatment parameters and cumulative dose are indicated above.\\nPrior to administering the radiation, the patient underwent a verification therapeutic radiology\\nsimulation-aided field setting defining relevant normal and abnormal target anatomy and acquiring\\nimages with high frequency ultrasound in addition to data necessary developing optimal radiation\\ntreatment process for the patient. This process includes verification of the treatment port(s) and proper\\ntreatment positioning. All treatment ports were photographed within electronic medical record. The\\npatient’s customized lead blocking along with gross tumor volume and margin was\\nconfirmed. Considering superficial radiotherapy is clinical in setup, this requires physician and\\nradiation therapist to clarify location interest being treated against initial images, pathology and\\npatient anatomy. Care was taken ensuring fields treated were geometrically accurate and properly\\npositioned using therapeutic radiology simulation-aided field setting verification per fraction. This\\nprocess is also utilized to determine if any prescription or setup changes are necessary. These steps are,\\ntherefore, medically necessary ensuring safe and effective administration of radiation. Ongoing\\ntherapeutic radiology simulation-aided field setting verification is ordered throughout course of\\ntherapy.\\n\\nA high frequency ultrasound image was acquired today for two-dimensional evaluation of the tumor\\nvolume and response to treatment, in addition to geometric accuracy of field placement. US depth Is\\n1.36 mm, which is +/- 0.00 mm in difference from previous imaging. The field placement and\\nultrasound imaging, per fraction, is separate and distinct from the initial simulation, and is an\\nimportant task in providing safe administration of superficial radiation therapy. Physician evaluation of\\nthe ultrasound tumor depth will be ongoing throughout the course of treatment, and is deemed\\nmedically necessary in order to ensure the efficacy of treatment and any necessary changes. Today’s\\nimage was evaluated for determination of continuation of treatment with the current plan or with\\nnecessary changes as appropriate. According to provider review of verification therapeutic radiology\\nsimulation-aided field setting and imaging, no change is required. Additionally, the use of ultrasound visualization \\nand targeted assessment allows the patient to be able to see their cancer(s) progress, encouraging patient to\\ncomplete and maintain compliance through full course of radiotherapy.\\n\\nPer Dr. Suarez, continued ultrasound guidance and therapeutic radiology simulation-aided field setting\\nverification per fraction is required for field placement, measurement of tumor depth, progress and\\nacute effect monitoring.\\n

## 2021-03-11 NOTE — PROCEDURE: SUPERFICIAL RADIATION TREATMENT
Intro Statement (Will Not Render If Left Blank): The patient is undergoing superficial radiation therapy for skin cancer and presents for weekly evaluation and management.  Per protocol and as documented on the flow sheet, the patient was questioned as to subjective redness, pruritus, pain, drainage, fatigue, or any other symptoms.  Objectively, the radiation area was evaluated with regards to erythema, atrophy, scale, crusting, erosion, ulceration, edema, purpura, tenderness, warmth, drainage, and any other findings.  The plan was extensively reviewed including the dose, and dosing schedule.  The simulation and clinical setup was also reviewed as was the external and any internal shields and based on this review the appropriateness and sufficiency of treatment was determined.
Render Additional Prescriptions In Note?: No
Number Of Days Off Treatment: 1
Simple Simulation Preamble Text Will Be Included With Simple Simulations (.......... Indications): Simple simulation was performed today for the following reasons:
Bill For Radiation Treatment: Yes
Day Of The Week Treatment Administered: Thursday
Information: Selecting Yes will display possible errors in your note based on the variables you have selected. This validation is only offered as a suggestion for you. PLEASE NOTE THAT THE VALIDATION TEXT WILL BE REMOVED WHEN YOU FINALIZE YOUR NOTE. IF YOU WANT TO FAX A PRELIMINARY NOTE YOU WILL NEED TO TOGGLE THIS TO 'NO' IF YOU DO NOT WANT IT IN YOUR FAXED NOTE.
Time Dose Fractionation (Optional- Include Units If Applicable): 96
Energy (Optional-Please Include Units): 70
Field Size (Applicator): 1.5 cm
Detail Level: Detailed
Dose Per Fractionation In Cgy (Optional): 270.45
Fractionation Number (Evaluation): 15
Functional Status: 1 (ambulatory, light activity)
Energy (Include Units): 70kV
Initial Radiation Treatment Planning (Will Render If Bill Simulation = Yes): The patient had a complete consultation regarding all applicable modalities for the treatment of their skin cancer and based on a variety of factors including the type of tumor, size, and location, the relevant medical history as well as local tissue factors, the functional status of the individual, the ability to perform necessary postoperative wound instructions and the need for simultaneous treatments as well as overall wound healing status, it was determined that the patient would begin radiation therapy treatment for skin cancer.  A full simulation and treatment device design was performed including the determination and formulation of appropriate simple and complex devices including lead shield of 0.762 mm thickness to form molded customized shielding to specifically correlate with the lesion size including treatment margin.  The custom lead shield is adequate to accommodate the appropriate applicator and provide adequate shielding around the treatment site.  The specific field applicator, shields, and devices both simple and complex as well as the specific patient setup is outlined below.  The patient was given a full consent for superficial radiation to both verbally and in writing and the full determination of patient's eligibility for treatment and selection is outlined on the patient eligibility and treatment selection form.  The specific superficial radiotherapy prescription was determined and was documented on the superficial radiotherapy prescription form.  A treatment calculation was also performed and documented on the treatment calculation form.  Based on the prescription, the patient was scheduled for a series of fractional treatments.
Field Number: 2
Patient Positioning: Sitting
Shielding Size (Optional- Include Units): 2.0 cm
Total Number Of Fractions: 20
Depth (Optional-Please Include Units): .85 mm
Dose / Tx In Cgy (Optional): 270.90
Treatment Margins In Cm: 0.35
Comments: On Target, RTOG 1
Daily Fractionated Dose (Optional- Include Units): 270.90 cGy
Assessment: Appropriate reaction
Total Dose (Optional-Please Include Units): 5409.0
Fractions / Week Rx 4: 5
Custom Shielding Afterword Text Will Not Be Included With Simple Simulations (X X Y Cm............): port to correlate with the lesion size, including treatment margin. The custom lead shield is adequate to accommodate the appropriate applicator and provide adequate shielding around the treatment site. Additional shielding (as noted below) is used to protect sensitive, normal tissues.
Cumulative Dose In Cgy (Optional): 4334.4
Custom Shielding Preamble Text Will Not Be Included With Simple Simulations (.......... X X Y Cm): A lead shield of 0.762 mm thickness is utilized to form a molded, custom shield with a
Field Size (Applicator): 2.5 cm
Dimensions-Y Axis In Cm: 0.8
Energy (Optional-Please Include Units): 70 KV
Pathology Override (Pathology Will Render As Diagnosis Name If Left Blank): BCC nodular
Total Dose (Optional-Please Include Units): 5418.0
Shielding Size (Optional- Include Units): none
Computed Treatment Time In Min (Will Render The Same As Calculated Treatment Time If Left Blank): 0.43 min
Treatment Time / Fractionation (Optional- Include Units): 0.45 min
Treatment Time In Min (Optional): 0.45
Fractions / Week: 3
Dose Per Fractionation In Cgy (Optional): 270.9
Depth (Optional-Please Include Units): 0.97 mm
Fractionation Number: 16
Treatment Margins In Cm: 0.5
Treatment Device Design After Initial Simulation Justification (Will Render If Bill For Treatment Devices = Yes): The patient is status post radiation simulation and is evaluated as to the use of additional devices for shielding and placement for radiation therapy.
Daily Fractionated Dose (Optional- Include Units): 270.45 cGy
Cumulative Dose In Cgy (Optional): 4327.20
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
Treatment Time In Min (Optional): 0.43

## 2021-03-16 ENCOUNTER — APPOINTMENT (OUTPATIENT)
Age: 76
Setting detail: DERMATOLOGY
End: 2021-03-19

## 2021-03-16 PROBLEM — C44.329 SQUAMOUS CELL CARCINOMA OF SKIN OF OTHER PARTS OF FACE: Status: ACTIVE | Noted: 2021-03-16

## 2021-03-16 PROBLEM — C44.311 BASAL CELL CARCINOMA OF SKIN OF NOSE: Status: ACTIVE | Noted: 2021-03-16

## 2021-03-16 PROCEDURE — 77401 RADIATION TX DELIVERY SUPFC: CPT

## 2021-03-16 PROCEDURE — OTHER FOLLOW UP FOR NEXT VISIT: OTHER

## 2021-03-16 PROCEDURE — 77280 THER RAD SIMULAJ FIELD SMPL: CPT

## 2021-03-16 PROCEDURE — G6001 ECHO GUIDANCE RADIOTHERAPY: HCPCS

## 2021-03-16 PROCEDURE — OTHER TREATMENT REGIMEN: OTHER

## 2021-03-16 PROCEDURE — OTHER SUPERFICIAL RADIATION TREATMENT: OTHER

## 2021-03-16 NOTE — PROCEDURE: SUPERFICIAL RADIATION TREATMENT
Treatment Margins In Cm: 0.5
Dose / Tx In Cgy (Optional): 270.45
Custom Shielding Preamble Text Will Not Be Included With Simple Simulations (.......... X X Y Cm): A lead shield of 0.762 mm thickness is utilized to form a molded, custom shield with a
Initial Radiation Treatment Planning (Will Render If Bill Simulation = Yes): The patient had a complete consultation regarding all applicable modalities for the treatment of their skin cancer and based on a variety of factors including the type of tumor, size, and location, the relevant medical history as well as local tissue factors, the functional status of the individual, the ability to perform necessary postoperative wound instructions and the need for simultaneous treatments as well as overall wound healing status, it was determined that the patient would begin radiation therapy treatment for skin cancer.  A full simulation and treatment device design was performed including the determination and formulation of appropriate simple and complex devices including lead shield of 0.762 mm thickness to form molded customized shielding to specifically correlate with the lesion size including treatment margin.  The custom lead shield is adequate to accommodate the appropriate applicator and provide adequate shielding around the treatment site.  The specific field applicator, shields, and devices both simple and complex as well as the specific patient setup is outlined below.  The patient was given a full consent for superficial radiation to both verbally and in writing and the full determination of patient's eligibility for treatment and selection is outlined on the patient eligibility and treatment selection form.  The specific superficial radiotherapy prescription was determined and was documented on the superficial radiotherapy prescription form.  A treatment calculation was also performed and documented on the treatment calculation form.  Based on the prescription, the patient was scheduled for a series of fractional treatments.
Field Number: 1
Comments: On Target, RTOG 1
Day Of The Week Treatment Administered: Tuesday
Validate Note Data (See Information Below): Yes
Fractionation Number: 17
Fractions / Week: 3
Dose / Tx In Cgy (Optional): 270.90
Daily Fractionated Dose (Optional- Include Units): 270.90 cGy
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
Field Size (Applicator): 2.5 cm
Simple Simulation Preamble Text Will Be Included With Simple Simulations (.......... Indications): Simple simulation was performed today for the following reasons:
Treatment Margins In Cm: 0.35
Render Prescriptions In Note?: No
Shielding Size (Optional- Include Units): 2.0 cm
Total Number Of Fractions Rx 4: 15
Treatment Time In Min (Optional): 0.43
Fractions / Week Rx 2: 5
Total Dose (Optional-Please Include Units): 5418.0
Port Dimensions-X Axis In Cm: 2
Energy (Optional-Please Include Units): 70 KV
Treatment Time / Fractionation (Optional- Include Units): 0.43 min
Cumulative Dose In Cgy (Optional): 4597.65
Information: Selecting Yes will display possible errors in your note based on the variables you have selected. This validation is only offered as a suggestion for you. PLEASE NOTE THAT THE VALIDATION TEXT WILL BE REMOVED WHEN YOU FINALIZE YOUR NOTE. IF YOU WANT TO FAX A PRELIMINARY NOTE YOU WILL NEED TO TOGGLE THIS TO 'NO' IF YOU DO NOT WANT IT IN YOUR FAXED NOTE.
Dimensions-X Axis In Cm: 0.8
Intro Statement (Will Not Render If Left Blank): The patient is undergoing superficial radiation therapy for skin cancer and presents for weekly evaluation and management.  Per protocol and as documented on the flow sheet, the patient was questioned as to subjective redness, pruritus, pain, drainage, fatigue, or any other symptoms.  Objectively, the radiation area was evaluated with regards to erythema, atrophy, scale, crusting, erosion, ulceration, edema, purpura, tenderness, warmth, drainage, and any other findings.  The plan was extensively reviewed including the dose, and dosing schedule.  The simulation and clinical setup was also reviewed as was the external and any internal shields and based on this review the appropriateness and sufficiency of treatment was determined.
Assessment: Appropriate reaction
Detail Level: Detailed
Cumulative Dose In Cgy (Optional): 4605.3
Functional Status: 1 (ambulatory, light activity)
Energy (Include Units): 70kV
Treatment Device Design After Initial Simulation Justification (Will Render If Bill For Treatment Devices = Yes): The patient is status post radiation simulation and is evaluated as to the use of additional devices for shielding and placement for radiation therapy.
Dose Per Fractionation In Cgy (Optional): 270.9
Field Size (Applicator): 1.5 cm
Shielding Size (Optional- Include Units): none
Total Number Of Fractions: 20
Computed Treatment Time In Min (Will Render The Same As Calculated Treatment Time If Left Blank): 0.45 min
Daily Fractionated Dose (Optional- Include Units): 270.45 cGy
Patient Positioning: Sitting
Custom Shielding Afterword Text Will Not Be Included With Simple Simulations (X X Y Cm............): port to correlate with the lesion size, including treatment margin. The custom lead shield is adequate to accommodate the appropriate applicator and provide adequate shielding around the treatment site. Additional shielding (as noted below) is used to protect sensitive, normal tissues.
Time Dose Fractionation (Optional- Include Units If Applicable): 96
Energy (Optional-Please Include Units): 70
Total Dose (Optional-Please Include Units): 5409.0
Treatment Time In Min (Optional): 0.45
Pathology Override (Pathology Will Render As Diagnosis Name If Left Blank): BCC nodular
Depth (Optional-Please Include Units): .85 mm
Depth (Optional-Please Include Units): 0.97 mm

## 2021-03-16 NOTE — PROCEDURE: TREATMENT REGIMEN
Detail Level: Zone
Plan: Treatment with u/s\\n\\nThis patient has been treated today with image guided superficial radiation therapy for non-melanoma\\nskin cancer.\\n\\nWritten informed consent has been previously obtained from this patient for this treatment. This\\nconsent is documented in the patient’s chart. The patient gave verbal consent to continue treatment\\ntoday.\\n\\nThe patient was treated with a specific radiation dose and setup as prescribed by the provider listed on\\nthis visit note. A Radiation Therapist performed administration of radiation under supervision of\\nprovider. The treatment parameters and cumulative dose are indicated above.\\nPrior to administering the radiation, the patient underwent a verification therapeutic radiology\\nsimulation-aided field setting defining relevant normal and abnormal target anatomy and acquiring\\nimages with high frequency ultrasound in addition to data necessary developing optimal radiation\\ntreatment process for the patient. This process includes verification of the treatment port(s) and proper\\ntreatment positioning. All treatment ports were photographed within electronic medical record. The\\npatient’s customized lead blocking along with gross tumor volume and margin was\\nconfirmed. Considering superficial radiotherapy is clinical in setup, this requires physician and\\nradiation therapist to clarify location interest being treated against initial images, pathology and\\npatient anatomy. Care was taken ensuring fields treated were geometrically accurate and properly\\npositioned using therapeutic radiology simulation-aided field setting verification per fraction. This\\nprocess is also utilized to determine if any prescription or setup changes are necessary. These steps are,\\ntherefore, medically necessary ensuring safe and effective administration of radiation. Ongoing\\ntherapeutic radiology simulation-aided field setting verification is ordered throughout course of\\ntherapy.\\n\\nA high frequency ultrasound image was acquired today for two-dimensional evaluation of the tumor\\nvolume and response to treatment, in addition to geometric accuracy of field placement. US depth Is\\n1.17 mm, which is +/- 0.32 mm in difference from previous imaging. The field placement and\\nultrasound imaging, per fraction, is separate and distinct from the initial simulation, and is an\\nimportant task in providing safe administration of superficial radiation therapy. Physician evaluation of\\nthe ultrasound tumor depth will be ongoing throughout the course of treatment, and is deemed\\nmedically necessary in order to ensure the efficacy of treatment and any necessary changes. Today’s\\nimage was evaluated for determination of continuation of treatment with the current plan or with\\nnecessary changes as appropriate. According to provider review of verification therapeutic radiology\\nsimulation-aided field setting and imaging, no change is required. Additionally, the use of ultrasound visualization \\nand targeted assessment allows the patient to be able to see their cancer(s) progress, encouraging patient to\\ncomplete and maintain compliance through full course of radiotherapy.\\n\\nPer Dr. Suarez, continued ultrasound guidance and therapeutic radiology simulation-aided field setting\\nverification per fraction is required for field placement, measurement of tumor depth, progress and\\nacute effect monitoring.\\n
Plan: Treatment with u/s\\n\\nThis patient has been treated today with image guided superficial radiation therapy for non-melanoma\\nskin cancer.\\n\\nWritten informed consent has been previously obtained from this patient for this treatment. This\\nconsent is documented in the patient’s chart. The patient gave verbal consent to continue treatment\\ntoday.\\n\\nThe patient was treated with a specific radiation dose and setup as prescribed by the provider listed on\\nthis visit note. A Radiation Therapist performed administration of radiation under supervision of\\nprovider. The treatment parameters and cumulative dose are indicated above.\\nPrior to administering the radiation, the patient underwent a verification therapeutic radiology\\nsimulation-aided field setting defining relevant normal and abnormal target anatomy and acquiring\\nimages with high frequency ultrasound in addition to data necessary developing optimal radiation\\ntreatment process for the patient. This process includes verification of the treatment port(s) and proper\\ntreatment positioning. All treatment ports were photographed within electronic medical record. The\\npatient’s customized lead blocking along with gross tumor volume and margin was\\nconfirmed. Considering superficial radiotherapy is clinical in setup, this requires physician and\\nradiation therapist to clarify location interest being treated against initial images, pathology and\\npatient anatomy. Care was taken ensuring fields treated were geometrically accurate and properly\\npositioned using therapeutic radiology simulation-aided field setting verification per fraction. This\\nprocess is also utilized to determine if any prescription or setup changes are necessary. These steps are,\\ntherefore, medically necessary ensuring safe and effective administration of radiation. Ongoing\\ntherapeutic radiology simulation-aided field setting verification is ordered throughout course of\\ntherapy.\\n\\nA high frequency ultrasound image was acquired today for two-dimensional evaluation of the tumor\\nvolume and response to treatment, in addition to geometric accuracy of field placement. US depth Is\\n0.85 mm, which is +/- 0.51 mm in difference from previous imaging. The field placement and\\nultrasound imaging, per fraction, is separate and distinct from the initial simulation, and is an\\nimportant task in providing safe administration of superficial radiation therapy. Physician evaluation of\\nthe ultrasound tumor depth will be ongoing throughout the course of treatment, and is deemed\\nmedically necessary in order to ensure the efficacy of treatment and any necessary changes. Today’s\\nimage was evaluated for determination of continuation of treatment with the current plan or with\\nnecessary changes as appropriate. According to provider review of verification therapeutic radiology\\nsimulation-aided field setting and imaging, no change is required. Additionally, the use of ultrasound visualization \\nand targeted assessment allows the patient to be able to see their cancer(s) progress, encouraging patient to\\ncomplete and maintain compliance through full course of radiotherapy.\\n\\nPer Dr. Suarez, continued ultrasound guidance and therapeutic radiology simulation-aided field setting\\nverification per fraction is required for field placement, measurement of tumor depth, progress and\\nacute effect monitoring.\\n

## 2021-03-18 ENCOUNTER — APPOINTMENT (OUTPATIENT)
Age: 76
Setting detail: DERMATOLOGY
End: 2021-03-19

## 2021-03-18 PROBLEM — C44.311 BASAL CELL CARCINOMA OF SKIN OF NOSE: Status: ACTIVE | Noted: 2021-03-18

## 2021-03-18 PROBLEM — C44.329 SQUAMOUS CELL CARCINOMA OF SKIN OF OTHER PARTS OF FACE: Status: ACTIVE | Noted: 2021-03-18

## 2021-03-18 PROCEDURE — G6001 ECHO GUIDANCE RADIOTHERAPY: HCPCS

## 2021-03-18 PROCEDURE — 77401 RADIATION TX DELIVERY SUPFC: CPT

## 2021-03-18 PROCEDURE — OTHER SUPERFICIAL RADIATION TREATMENT: OTHER

## 2021-03-18 PROCEDURE — OTHER FOLLOW UP FOR NEXT VISIT: OTHER

## 2021-03-18 PROCEDURE — OTHER TREATMENT REGIMEN: OTHER

## 2021-03-18 PROCEDURE — 77280 THER RAD SIMULAJ FIELD SMPL: CPT

## 2021-03-18 NOTE — PROCEDURE: SUPERFICIAL RADIATION TREATMENT
Port Dimensions-Y Axis In Cm: 2
Prescription Used: 1
Fractions / Week: 3
Custom Shielding Afterword Text Will Not Be Included With Simple Simulations (X X Y Cm............): port to correlate with the lesion size, including treatment margin. The custom lead shield is adequate to accommodate the appropriate applicator and provide adequate shielding around the treatment site. Additional shielding (as noted below) is used to protect sensitive, normal tissues.
Port Dimensions-Y Axis In Cm: 0.8
Fractionation Number (Evaluation): 15
Assessment: Appropriate reaction
Include Rx 2 When Rendering Additional Prescriptions: No
Cumulative Dose In Cgy (Optional): 4868.1
Simple Simulation Afterword Text Will Be Included With Simple Simulations (Indications............): The patient had a complete consultation regarding all applicable modalities for the treatment of their skin cancer and based on a variety of factors including the type of tumor, size, and location, the relevant medical history as well as local tissue factors, the functional status of the individual, the ability to perform necessary postoperative wound instructions and the need for simultaneous treatments as well as overall wound healing status, it was determined that the patient would begin radiation therapy treatment for skin cancer.  A full simulation and treatment device design was performed including the determination and formulation of appropriate simple and complex devices including lead shield of 0.762 mm thickness to form molded customized shielding to specifically correlate with the lesion size including treatment margin.  The custom lead shield is adequate to accommodate the appropriate applicator and provide adequate shielding around the treatment site.  The specific field applicator, shields, and devices both simple and complex as well as the specific patient setup is outlined below.  The patient was given a full consent for superficial radiation to both verbally and in writing and the full determination of patient's eligibility for treatment and selection is outlined on the patient eligibility and treatment selection form.  The specific superficial radiotherapy prescription was determined and was documented on the superficial radiotherapy prescription form.  A treatment calculation was also performed and documented on the treatment calculation form.  Based on the prescription, the patient was scheduled for a series of fractional treatments.
Information: Selecting Yes will display possible errors in your note based on the variables you have selected. This validation is only offered as a suggestion for you. PLEASE NOTE THAT THE VALIDATION TEXT WILL BE REMOVED WHEN YOU FINALIZE YOUR NOTE. IF YOU WANT TO FAX A PRELIMINARY NOTE YOU WILL NEED TO TOGGLE THIS TO 'NO' IF YOU DO NOT WANT IT IN YOUR FAXED NOTE.
Fractions / Week Rx 4: 5
Depth (Optional-Please Include Units): 0.97 mm
Energy (Optional-Please Include Units): 70 KV
Day Of The Week Treatment Administered: Thursday
Shielding Size (Optional- Include Units): none
Validate Note Data (See Information Below): Yes
Comments: On [employer], RTOG 1
Field Size (Applicator): 2.5 cm
Field Size (Applicator): 1.5 cm
Energy (Optional-Please Include Units): 70
Treatment Margins In Cm: 0.5
Patient Positioning: Sitting
Dose Per Fractionation In Cgy (Optional): 270.45
Functional Status: 1 (ambulatory, light activity)
Treatment Device Design After Initial Simulation Justification (Will Render If Bill For Treatment Devices = Yes): The patient is status post radiation simulation and is evaluated as to the use of additional devices for shielding and placement for radiation therapy.
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
Treatment Time In Min (Optional): 0.43
Dose Per Fractionation In Cgy (Optional): 270.9
Treatment Time / Fractionation (Optional- Include Units): 0.45 min
Treatment Time / Fractionation (Optional- Include Units): 0.43 min
Custom Shielding Preamble Text Will Not Be Included With Simple Simulations (.......... X X Y Cm): A lead shield of 0.762 mm thickness is utilized to form a molded, custom shield with a
Simple Simulation Preamble Text Will Be Included With Simple Simulations (.......... Indications): Simple simulation was performed today for the following reasons:
Energy (Include Units): 70kV
Time Dose Fractionation (Optional- Include Units If Applicable): 96
Treatment Margins In Cm: 0.35
Total Number Of Fractions: 20
Detail Level: Detailed
Dose / Tx In Cgy (Optional): 270.90
Intro Statement (Will Not Render If Left Blank): The patient is undergoing superficial radiation therapy for skin cancer and presents for weekly evaluation and management.  Per protocol and as documented on the flow sheet, the patient was questioned as to subjective redness, pruritus, pain, drainage, fatigue, or any other symptoms.  Objectively, the radiation area was evaluated with regards to erythema, atrophy, scale, crusting, erosion, ulceration, edema, purpura, tenderness, warmth, drainage, and any other findings.  The plan was extensively reviewed including the dose, and dosing schedule.  The simulation and clinical setup was also reviewed as was the external and any internal shields and based on this review the appropriateness and sufficiency of treatment was determined.
Pathology Override (Pathology Will Render As Diagnosis Name If Left Blank): BCC nodular
Treatment Time In Min (Optional): 0.45
Shielding Size (Optional- Include Units): 2.0 cm
Daily Fractionated Dose (Optional- Include Units): 270.90 cGy
Total Dose (Optional-Please Include Units): 5409.0
Fractionation Number: 18
Cumulative Dose In Cgy (Optional): 4876.2
Daily Fractionated Dose (Optional- Include Units): 270.45 cGy
Depth (Optional-Please Include Units): .85 mm
Total Dose (Optional-Please Include Units): 5418.0

## 2021-03-18 NOTE — PROCEDURE: TREATMENT REGIMEN
Plan: Treatment with u/s\\n\\nThis patient has been treated today with image guided superficial radiation therapy for non-melanoma\\nskin cancer.\\n\\nWritten informed consent has been previously obtained from this patient for this treatment. This\\nconsent is documented in the patient’s chart. The patient gave verbal consent to continue treatment\\ntoday.\\n\\nThe patient was treated with a specific radiation dose and setup as prescribed by the provider listed on\\nthis visit note. A Radiation Therapist performed administration of radiation under supervision of\\nprovider. The treatment parameters and cumulative dose are indicated above.\\nPrior to administering the radiation, the patient underwent a verification therapeutic radiology\\nsimulation-aided field setting defining relevant normal and abnormal target anatomy and acquiring\\nimages with high frequency ultrasound in addition to data necessary developing optimal radiation\\ntreatment process for the patient. This process includes verification of the treatment port(s) and proper\\ntreatment positioning. All treatment ports were photographed within electronic medical record. The\\npatient’s customized lead blocking along with gross tumor volume and margin was\\nconfirmed. Considering superficial radiotherapy is clinical in setup, this requires physician and\\nradiation therapist to clarify location interest being treated against initial images, pathology and\\npatient anatomy. Care was taken ensuring fields treated were geometrically accurate and properly\\npositioned using therapeutic radiology simulation-aided field setting verification per fraction. This\\nprocess is also utilized to determine if any prescription or setup changes are necessary. These steps are,\\ntherefore, medically necessary ensuring safe and effective administration of radiation. Ongoing\\ntherapeutic radiology simulation-aided field setting verification is ordered throughout course of\\ntherapy.\\n\\nA high frequency ultrasound image was acquired today for two-dimensional evaluation of the tumor\\nvolume and response to treatment, in addition to geometric accuracy of field placement. US depth Is\\n1.04 mm, which is +/- 0.19 mm in difference from previous imaging. The field placement and\\nultrasound imaging, per fraction, is separate and distinct from the initial simulation, and is an\\nimportant task in providing safe administration of superficial radiation therapy. Physician evaluation of\\nthe ultrasound tumor depth will be ongoing throughout the course of treatment, and is deemed\\nmedically necessary in order to ensure the efficacy of treatment and any necessary changes. Today’s\\nimage was evaluated for determination of continuation of treatment with the current plan or with\\nnecessary changes as appropriate. According to provider review of verification therapeutic radiology\\nsimulation-aided field setting and imaging, no change is required. Additionally, the use of ultrasound visualization \\nand targeted assessment allows the patient to be able to see their cancer(s) progress, encouraging patient to\\ncomplete and maintain compliance through full course of radiotherapy.\\n\\nPer Dr. Suarez, continued ultrasound guidance and therapeutic radiology simulation-aided field setting\\nverification per fraction is required for field placement, measurement of tumor depth, progress and\\nacute effect monitoring.\\n
Detail Level: Zone
Plan: Treatment with u/s\\n\\nThis patient has been treated today with image guided superficial radiation therapy for non-melanoma\\nskin cancer.\\n\\nWritten informed consent has been previously obtained from this patient for this treatment. This\\nconsent is documented in the patient’s chart. The patient gave verbal consent to continue treatment\\ntoday.\\n\\nThe patient was treated with a specific radiation dose and setup as prescribed by the provider listed on\\nthis visit note. A Radiation Therapist performed administration of radiation under supervision of\\nprovider. The treatment parameters and cumulative dose are indicated above.\\nPrior to administering the radiation, the patient underwent a verification therapeutic radiology\\nsimulation-aided field setting defining relevant normal and abnormal target anatomy and acquiring\\nimages with high frequency ultrasound in addition to data necessary developing optimal radiation\\ntreatment process for the patient. This process includes verification of the treatment port(s) and proper\\ntreatment positioning. All treatment ports were photographed within electronic medical record. The\\npatient’s customized lead blocking along with gross tumor volume and margin was\\nconfirmed. Considering superficial radiotherapy is clinical in setup, this requires physician and\\nradiation therapist to clarify location interest being treated against initial images, pathology and\\npatient anatomy. Care was taken ensuring fields treated were geometrically accurate and properly\\npositioned using therapeutic radiology simulation-aided field setting verification per fraction. This\\nprocess is also utilized to determine if any prescription or setup changes are necessary. These steps are,\\ntherefore, medically necessary ensuring safe and effective administration of radiation. Ongoing\\ntherapeutic radiology simulation-aided field setting verification is ordered throughout course of\\ntherapy.\\n\\nA high frequency ultrasound image was acquired today for two-dimensional evaluation of the tumor\\nvolume and response to treatment, in addition to geometric accuracy of field placement. US depth Is\\n1.49 mm, which is +/- 0.32 mm in difference from previous imaging. The field placement and\\nultrasound imaging, per fraction, is separate and distinct from the initial simulation, and is an\\nimportant task in providing safe administration of superficial radiation therapy. Physician evaluation of\\nthe ultrasound tumor depth will be ongoing throughout the course of treatment, and is deemed\\nmedically necessary in order to ensure the efficacy of treatment and any necessary changes. Today’s\\nimage was evaluated for determination of continuation of treatment with the current plan or with\\nnecessary changes as appropriate. According to provider review of verification therapeutic radiology\\nsimulation-aided field setting and imaging, no change is required. Additionally, the use of ultrasound visualization \\nand targeted assessment allows the patient to be able to see their cancer(s) progress, encouraging patient to\\ncomplete and maintain compliance through full course of radiotherapy.\\n\\nPer Dr. Suarez, continued ultrasound guidance and therapeutic radiology simulation-aided field setting\\nverification per fraction is required for field placement, measurement of tumor depth, progress and\\nacute effect monitoring.\\n

## 2021-03-19 ENCOUNTER — APPOINTMENT (OUTPATIENT)
Age: 76
Setting detail: DERMATOLOGY
End: 2021-03-19

## 2021-03-20 ENCOUNTER — APPOINTMENT (OUTPATIENT)
Age: 76
Setting detail: DERMATOLOGY
End: 2021-04-11

## 2021-03-20 PROCEDURE — 77336 RADIATION PHYSICS CONSULT: CPT

## 2021-03-23 ENCOUNTER — APPOINTMENT (OUTPATIENT)
Age: 76
Setting detail: DERMATOLOGY
End: 2021-03-23

## 2021-03-23 ENCOUNTER — APPOINTMENT (OUTPATIENT)
Age: 76
Setting detail: DERMATOLOGY
End: 2021-03-26

## 2021-03-23 PROBLEM — C44.311 BASAL CELL CARCINOMA OF SKIN OF NOSE: Status: ACTIVE | Noted: 2021-03-23

## 2021-03-23 PROBLEM — C44.329 SQUAMOUS CELL CARCINOMA OF SKIN OF OTHER PARTS OF FACE: Status: ACTIVE | Noted: 2021-03-23

## 2021-03-23 PROCEDURE — OTHER TREATMENT REGIMEN: OTHER

## 2021-03-23 PROCEDURE — 77401 RADIATION TX DELIVERY SUPFC: CPT

## 2021-03-23 PROCEDURE — OTHER FOLLOW UP FOR NEXT VISIT: OTHER

## 2021-03-23 PROCEDURE — G6001 ECHO GUIDANCE RADIOTHERAPY: HCPCS

## 2021-03-23 PROCEDURE — 77280 THER RAD SIMULAJ FIELD SMPL: CPT

## 2021-03-23 PROCEDURE — OTHER SUPERFICIAL RADIATION TREATMENT: OTHER

## 2021-03-23 NOTE — PROCEDURE: SUPERFICIAL RADIATION TREATMENT
Fractions / Week Rx 3: 5
Total Number Of Fractions Rx 2: 15
Port Dimensions-Y Axis In Cm: 2
Dimensions-Y Axis In Cm: 1
Field Size (Applicator): 1.5 cm
Custom Shielding Preamble Text Will Not Be Included With Simple Simulations (.......... X X Y Cm): A lead shield of 0.762 mm thickness is utilized to form a molded, custom shield with a
Energy (Optional-Please Include Units): 70
Render Additional Prescriptions In Note?: No
Functional Status: 1 (ambulatory, light activity)
Assessment: Appropriate reaction
Time Dose Fractionation (Optional- Include Units If Applicable): 96
Field Size (Applicator): 2.0 cm
Comments: On Target, RTOG 1
Field Size (Applicator): 2.5 cm
Simple Simulation Preamble Text Will Be Included With Simple Simulations (.......... Indications): Simple simulation was performed today for the following reasons:
Energy (Optional-Please Include Units): 70 KV
Detail Level: Detailed
Custom Shielding Afterword Text Will Not Be Included With Simple Simulations (X X Y Cm............): port to correlate with the lesion size, including treatment margin. The custom lead shield is adequate to accommodate the appropriate applicator and provide adequate shielding around the treatment site. Additional shielding (as noted below) is used to protect sensitive, normal tissues.
Computed Treatment Time In Min (Will Render The Same As Calculated Treatment Time If Left Blank): 0.45 min
Depth (Optional-Please Include Units): .85 mm
Fractionation Number: 19
Depth (Optional-Please Include Units): 0.97 mm
Include Rx 3 When Rendering Additional Prescriptions: Yes
Computed Treatment Time In Min (Will Render The Same As Calculated Treatment Time If Left Blank): 0.43 min
Intro Statement (Will Not Render If Left Blank): The patient is undergoing superficial radiation therapy for skin cancer and presents for weekly evaluation and management.  Per protocol and as documented on the flow sheet, the patient was questioned as to subjective redness, pruritus, pain, drainage, fatigue, or any other symptoms.  Objectively, the radiation area was evaluated with regards to erythema, atrophy, scale, crusting, erosion, ulceration, edema, purpura, tenderness, warmth, drainage, and any other findings.  The plan was extensively reviewed including the dose, and dosing schedule.  The simulation and clinical setup was also reviewed as was the external and any internal shields and based on this review the appropriateness and sufficiency of treatment was determined.
Daily Fractionated Dose (Optional- Include Units): 270.45 cGy
Energy (Include Units): 70kV
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
Simple Simulation Afterword Text Will Be Included With Simple Simulations (Indications............): The patient had a complete consultation regarding all applicable modalities for the treatment of their skin cancer and based on a variety of factors including the type of tumor, size, and location, the relevant medical history as well as local tissue factors, the functional status of the individual, the ability to perform necessary postoperative wound instructions and the need for simultaneous treatments as well as overall wound healing status, it was determined that the patient would begin radiation therapy treatment for skin cancer.  A full simulation and treatment device design was performed including the determination and formulation of appropriate simple and complex devices including lead shield of 0.762 mm thickness to form molded customized shielding to specifically correlate with the lesion size including treatment margin.  The custom lead shield is adequate to accommodate the appropriate applicator and provide adequate shielding around the treatment site.  The specific field applicator, shields, and devices both simple and complex as well as the specific patient setup is outlined below.  The patient was given a full consent for superficial radiation to both verbally and in writing and the full determination of patient's eligibility for treatment and selection is outlined on the patient eligibility and treatment selection form.  The specific superficial radiotherapy prescription was determined and was documented on the superficial radiotherapy prescription form.  A treatment calculation was also performed and documented on the treatment calculation form.  Based on the prescription, the patient was scheduled for a series of fractional treatments.
Treatment Device Design After Initial Simulation Justification (Will Render If Bill For Treatment Devices = Yes): The patient is status post radiation simulation and is evaluated as to the use of additional devices for shielding and placement for radiation therapy.
Treatment Time In Min (Optional): 0.43
Information: Selecting Yes will display possible errors in your note based on the variables you have selected. This validation is only offered as a suggestion for you. PLEASE NOTE THAT THE VALIDATION TEXT WILL BE REMOVED WHEN YOU FINALIZE YOUR NOTE. IF YOU WANT TO FAX A PRELIMINARY NOTE YOU WILL NEED TO TOGGLE THIS TO 'NO' IF YOU DO NOT WANT IT IN YOUR FAXED NOTE.
Dimensions-X Axis In Cm: 0.8
Total Dose (Optional-Please Include Units): 5409.0
Dose / Tx In Cgy (Optional): 270.45
Pathology Override (Pathology Will Render As Diagnosis Name If Left Blank): BCC nodular
Day Of The Week Treatment Administered: Tuesday
Fractions / Week: 3
Dose / Tx In Cgy (Optional): 270.90
Patient Positioning: Sitting
Total Number Of Fractions: 20
Cumulative Dose In Cgy (Optional): 5138.55
Daily Fractionated Dose (Optional- Include Units): 270.90 cGy
Treatment Margins In Cm: 0.5
Treatment Time In Min (Optional): 0.45
Cumulative Dose In Cgy (Optional): 5147.10
Total Dose (Optional-Please Include Units): 5418.0
Shielding Size (Optional- Include Units): none
Treatment Margins In Cm: 0.35
Dose Per Fractionation In Cgy (Optional): 270.9

## 2021-03-23 NOTE — PROCEDURE: TREATMENT REGIMEN
Detail Level: Zone
Plan: Treatment with u/s\\n\\nThis patient has been treated today with image guided superficial radiation therapy for non-melanoma\\nskin cancer.\\n\\nWritten informed consent has been previously obtained from this patient for this treatment. This\\nconsent is documented in the patient’s chart. The patient gave verbal consent to continue treatment\\ntoday.\\n\\nThe patient was treated with a specific radiation dose and setup as prescribed by the provider listed on\\nthis visit note. A Radiation Therapist performed administration of radiation under supervision of\\nprovider. The treatment parameters and cumulative dose are indicated above.\\nPrior to administering the radiation, the patient underwent a verification therapeutic radiology\\nsimulation-aided field setting defining relevant normal and abnormal target anatomy and acquiring\\nimages with high frequency ultrasound in addition to data necessary developing optimal radiation\\ntreatment process for the patient. This process includes verification of the treatment port(s) and proper\\ntreatment positioning. All treatment ports were photographed within electronic medical record. The\\npatient’s customized lead blocking along with gross tumor volume and margin was\\nconfirmed. Considering superficial radiotherapy is clinical in setup, this requires physician and\\nradiation therapist to clarify location interest being treated against initial images, pathology and\\npatient anatomy. Care was taken ensuring fields treated were geometrically accurate and properly\\npositioned using therapeutic radiology simulation-aided field setting verification per fraction. This\\nprocess is also utilized to determine if any prescription or setup changes are necessary. These steps are,\\ntherefore, medically necessary ensuring safe and effective administration of radiation. Ongoing\\ntherapeutic radiology simulation-aided field setting verification is ordered throughout course of\\ntherapy.\\n\\nA high frequency ultrasound image was acquired today for two-dimensional evaluation of the tumor\\nvolume and response to treatment, in addition to geometric accuracy of field placement. US depth Is\\n0.87 mm, which is +/- 0.17 mm in difference from previous imaging. The field placement and\\nultrasound imaging, per fraction, is separate and distinct from the initial simulation, and is an\\nimportant task in providing safe administration of superficial radiation therapy. Physician evaluation of\\nthe ultrasound tumor depth will be ongoing throughout the course of treatment, and is deemed\\nmedically necessary in order to ensure the efficacy of treatment and any necessary changes. Today’s\\nimage was evaluated for determination of continuation of treatment with the current plan or with\\nnecessary changes as appropriate. According to provider review of verification therapeutic radiology\\nsimulation-aided field setting and imaging, no change is required. Additionally, the use of ultrasound visualization \\nand targeted assessment allows the patient to be able to see their cancer(s) progress, encouraging patient to\\ncomplete and maintain compliance through full course of radiotherapy.\\n\\nPer Dr. Suarez, continued ultrasound guidance and therapeutic radiology simulation-aided field setting\\nverification per fraction is required for field placement, measurement of tumor depth, progress and\\nacute effect monitoring.\\n
Plan: Treatment with u/s\\n\\nThis patient has been treated today with image guided superficial radiation therapy for non-melanoma\\nskin cancer.\\n\\nWritten informed consent has been previously obtained from this patient for this treatment. This\\nconsent is documented in the patient’s chart. The patient gave verbal consent to continue treatment\\ntoday.\\n\\nThe patient was treated with a specific radiation dose and setup as prescribed by the provider listed on\\nthis visit note. A Radiation Therapist performed administration of radiation under supervision of\\nprovider. The treatment parameters and cumulative dose are indicated above.\\nPrior to administering the radiation, the patient underwent a verification therapeutic radiology\\nsimulation-aided field setting defining relevant normal and abnormal target anatomy and acquiring\\nimages with high frequency ultrasound in addition to data necessary developing optimal radiation\\ntreatment process for the patient. This process includes verification of the treatment port(s) and proper\\ntreatment positioning. All treatment ports were photographed within electronic medical record. The\\npatient’s customized lead blocking along with gross tumor volume and margin was\\nconfirmed. Considering superficial radiotherapy is clinical in setup, this requires physician and\\nradiation therapist to clarify location interest being treated against initial images, pathology and\\npatient anatomy. Care was taken ensuring fields treated were geometrically accurate and properly\\npositioned using therapeutic radiology simulation-aided field setting verification per fraction. This\\nprocess is also utilized to determine if any prescription or setup changes are necessary. These steps are,\\ntherefore, medically necessary ensuring safe and effective administration of radiation. Ongoing\\ntherapeutic radiology simulation-aided field setting verification is ordered throughout course of\\ntherapy.\\n\\nA high frequency ultrasound image was acquired today for two-dimensional evaluation of the tumor\\nvolume and response to treatment, in addition to geometric accuracy of field placement. US depth Is\\n1.18 mm, which is +/- 0.31 mm in difference from previous imaging. The field placement and\\nultrasound imaging, per fraction, is separate and distinct from the initial simulation, and is an\\nimportant task in providing safe administration of superficial radiation therapy. Physician evaluation of\\nthe ultrasound tumor depth will be ongoing throughout the course of treatment, and is deemed\\nmedically necessary in order to ensure the efficacy of treatment and any necessary changes. Today’s\\nimage was evaluated for determination of continuation of treatment with the current plan or with\\nnecessary changes as appropriate. According to provider review of verification therapeutic radiology\\nsimulation-aided field setting and imaging, no change is required. Additionally, the use of ultrasound visualization \\nand targeted assessment allows the patient to be able to see their cancer(s) progress, encouraging patient to\\ncomplete and maintain compliance through full course of radiotherapy.\\n\\nPer Dr. Suarez, continued ultrasound guidance and therapeutic radiology simulation-aided field setting\\nverification per fraction is required for field placement, measurement of tumor depth, progress and\\nacute effect monitoring.\\n

## 2021-03-25 ENCOUNTER — APPOINTMENT (OUTPATIENT)
Age: 76
Setting detail: DERMATOLOGY
End: 2021-03-26

## 2021-03-25 PROBLEM — C44.311 BASAL CELL CARCINOMA OF SKIN OF NOSE: Status: ACTIVE | Noted: 2021-03-25

## 2021-03-25 PROBLEM — C44.329 SQUAMOUS CELL CARCINOMA OF SKIN OF OTHER PARTS OF FACE: Status: ACTIVE | Noted: 2021-03-25

## 2021-03-25 PROCEDURE — OTHER FOLLOW UP FOR NEXT VISIT: OTHER

## 2021-03-25 PROCEDURE — OTHER SUPERFICIAL RADIATION TREATMENT: OTHER

## 2021-03-25 PROCEDURE — 77280 THER RAD SIMULAJ FIELD SMPL: CPT

## 2021-03-25 PROCEDURE — 77427 RADIATION TX MANAGEMENT X5: CPT

## 2021-03-25 PROCEDURE — OTHER TREATMENT REGIMEN: OTHER

## 2021-03-25 PROCEDURE — 77401 RADIATION TX DELIVERY SUPFC: CPT

## 2021-03-25 PROCEDURE — G6001 ECHO GUIDANCE RADIOTHERAPY: HCPCS

## 2021-03-25 NOTE — PROCEDURE: TREATMENT REGIMEN
Plan: Treatment with u/s\\n\\nThis patient has been treated today with image guided superficial radiation therapy for non-melanoma\\nskin cancer.\\n\\nWritten informed consent has been previously obtained from this patient for this treatment. This\\nconsent is documented in the patient’s chart. The patient gave verbal consent to continue treatment\\ntoday.\\n\\nThe patient was treated with a specific radiation dose and setup as prescribed by the provider listed on\\nthis visit note. A Radiation Therapist performed administration of radiation under supervision of\\nprovider. The treatment parameters and cumulative dose are indicated above.\\nPrior to administering the radiation, the patient underwent a verification therapeutic radiology\\nsimulation-aided field setting defining relevant normal and abnormal target anatomy and acquiring\\nimages with high frequency ultrasound in addition to data necessary developing optimal radiation\\ntreatment process for the patient. This process includes verification of the treatment port(s) and proper\\ntreatment positioning. All treatment ports were photographed within electronic medical record. The\\npatient’s customized lead blocking along with gross tumor volume and margin was\\nconfirmed. Considering superficial radiotherapy is clinical in setup, this requires physician and\\nradiation therapist to clarify location interest being treated against initial images, pathology and\\npatient anatomy. Care was taken ensuring fields treated were geometrically accurate and properly\\npositioned using therapeutic radiology simulation-aided field setting verification per fraction. This\\nprocess is also utilized to determine if any prescription or setup changes are necessary. These steps are,\\ntherefore, medically necessary ensuring safe and effective administration of radiation. Ongoing\\ntherapeutic radiology simulation-aided field setting verification is ordered throughout course of\\ntherapy.\\n\\nA high frequency ultrasound image was acquired today for two-dimensional evaluation of the tumor\\nvolume and response to treatment, in addition to geometric accuracy of field placement. US depth Is\\n1.36 mm, which is +/- 0.18 mm in difference from previous imaging. The field placement and\\nultrasound imaging, per fraction, is separate and distinct from the initial simulation, and is an\\nimportant task in providing safe administration of superficial radiation therapy. Physician evaluation of\\nthe ultrasound tumor depth will be ongoing throughout the course of treatment, and is deemed\\nmedically necessary in order to ensure the efficacy of treatment and any necessary changes. Today’s\\nimage was evaluated for determination of continuation of treatment with the current plan or with\\nnecessary changes as appropriate. According to provider review of verification therapeutic radiology\\nsimulation-aided field setting and imaging, no change is required. Additionally, the use of ultrasound visualization \\nand targeted assessment allows the patient to be able to see their cancer(s) progress, encouraging patient to\\ncomplete and maintain compliance through full course of radiotherapy.\\n\\nPer Dr. Suarez, continued ultrasound guidance and therapeutic radiology simulation-aided field setting\\nverification per fraction is required for field placement, measurement of tumor depth, progress and\\nacute effect monitoring.\\n
Detail Level: Zone
Plan: Treatment with u/s\\n\\nThis patient has been treated today with image guided superficial radiation therapy for non-melanoma\\nskin cancer.\\n\\nWritten informed consent has been previously obtained from this patient for this treatment. This\\nconsent is documented in the patient’s chart. The patient gave verbal consent to continue treatment\\ntoday.\\n\\nThe patient was treated with a specific radiation dose and setup as prescribed by the provider listed on\\nthis visit note. A Radiation Therapist performed administration of radiation under supervision of\\nprovider. The treatment parameters and cumulative dose are indicated above.\\nPrior to administering the radiation, the patient underwent a verification therapeutic radiology\\nsimulation-aided field setting defining relevant normal and abnormal target anatomy and acquiring\\nimages with high frequency ultrasound in addition to data necessary developing optimal radiation\\ntreatment process for the patient. This process includes verification of the treatment port(s) and proper\\ntreatment positioning. All treatment ports were photographed within electronic medical record. The\\npatient’s customized lead blocking along with gross tumor volume and margin was\\nconfirmed. Considering superficial radiotherapy is clinical in setup, this requires physician and\\nradiation therapist to clarify location interest being treated against initial images, pathology and\\npatient anatomy. Care was taken ensuring fields treated were geometrically accurate and properly\\npositioned using therapeutic radiology simulation-aided field setting verification per fraction. This\\nprocess is also utilized to determine if any prescription or setup changes are necessary. These steps are,\\ntherefore, medically necessary ensuring safe and effective administration of radiation. Ongoing\\ntherapeutic radiology simulation-aided field setting verification is ordered throughout course of\\ntherapy.\\n\\nA high frequency ultrasound image was acquired today for two-dimensional evaluation of the tumor\\nvolume and response to treatment, in addition to geometric accuracy of field placement. US depth Is\\n0.96 mm, which is +/- 0.09 mm in difference from previous imaging. The field placement and\\nultrasound imaging, per fraction, is separate and distinct from the initial simulation, and is an\\nimportant task in providing safe administration of superficial radiation therapy. Physician evaluation of\\nthe ultrasound tumor depth will be ongoing throughout the course of treatment, and is deemed\\nmedically necessary in order to ensure the efficacy of treatment and any necessary changes. Today’s\\nimage was evaluated for determination of continuation of treatment with the current plan or with\\nnecessary changes as appropriate. According to provider review of verification therapeutic radiology\\nsimulation-aided field setting and imaging, no change is required. Additionally, the use of ultrasound visualization \\nand targeted assessment allows the patient to be able to see their cancer(s) progress, encouraging patient to\\ncomplete and maintain compliance through full course of radiotherapy.\\n\\nPer Dr. Suarez, continued ultrasound guidance and therapeutic radiology simulation-aided field setting\\nverification per fraction is required for field placement, measurement of tumor depth, progress and\\nacute effect monitoring.\\n

## 2021-03-25 NOTE — PROCEDURE: SUPERFICIAL RADIATION TREATMENT
Information: Selecting Yes will display possible errors in your note based on the variables you have selected. This validation is only offered as a suggestion for you. PLEASE NOTE THAT THE VALIDATION TEXT WILL BE REMOVED WHEN YOU FINALIZE YOUR NOTE. IF YOU WANT TO FAX A PRELIMINARY NOTE YOU WILL NEED TO TOGGLE THIS TO 'NO' IF YOU DO NOT WANT IT IN YOUR FAXED NOTE.
Pathology Override (Pathology Will Render As Diagnosis Name If Left Blank): BCC nodular
Include Rx 4 When Rendering Additional Prescriptions: Yes
Field Size (Applicator): 2.5 cm
Total Number Of Fractions Rx 2: 15
Render Additional Prescriptions In Note?: No
Comments: On Target, RTOG 1
Energy (Include Units): 70kV
Fractions / Week Rx 2: 5
Dose Per Fractionation In Cgy (Optional): 270.45
Dimensions-Y Axis In Cm: 0.8
Day Of The Week Treatment Administered: Thursday
Total Number Of Fractions: 20
Shielding Size (Optional- Include Units): none
Detail Level: Detailed
Functional Status: 1 (ambulatory, light activity)
Prescription Used: 1
Initial Radiation Treatment Planning (Will Render If Bill Simulation = Yes): The patient had a complete consultation regarding all applicable modalities for the treatment of their skin cancer and based on a variety of factors including the type of tumor, size, and location, the relevant medical history as well as local tissue factors, the functional status of the individual, the ability to perform necessary postoperative wound instructions and the need for simultaneous treatments as well as overall wound healing status, it was determined that the patient would begin radiation therapy treatment for skin cancer.  A full simulation and treatment device design was performed including the determination and formulation of appropriate simple and complex devices including lead shield of 0.762 mm thickness to form molded customized shielding to specifically correlate with the lesion size including treatment margin.  The custom lead shield is adequate to accommodate the appropriate applicator and provide adequate shielding around the treatment site.  The specific field applicator, shields, and devices both simple and complex as well as the specific patient setup is outlined below.  The patient was given a full consent for superficial radiation to both verbally and in writing and the full determination of patient's eligibility for treatment and selection is outlined on the patient eligibility and treatment selection form.  The specific superficial radiotherapy prescription was determined and was documented on the superficial radiotherapy prescription form.  A treatment calculation was also performed and documented on the treatment calculation form.  Based on the prescription, the patient was scheduled for a series of fractional treatments.
Field Number: 2
Intro Statement (Will Not Render If Left Blank): The patient is undergoing superficial radiation therapy for skin cancer and presents for weekly evaluation and management.  Per protocol and as documented on the flow sheet, the patient was questioned as to subjective redness, pruritus, pain, drainage, fatigue, or any other symptoms.  Objectively, the radiation area was evaluated with regards to erythema, atrophy, scale, crusting, erosion, ulceration, edema, purpura, tenderness, warmth, drainage, and any other findings.  The plan was extensively reviewed including the dose, and dosing schedule.  The simulation and clinical setup was also reviewed as was the external and any internal shields and based on this review the appropriateness and sufficiency of treatment was determined.
Dose / Tx In Cgy (Optional): 270.90
Patient Positioning: Sitting
Field Size (Applicator): 1.5 cm
Energy (Optional-Please Include Units): 70 KV
Treatment Margins In Cm: 0.35
Custom Shielding Preamble Text Will Not Be Included With Simple Simulations (.......... X X Y Cm): A lead shield of 0.762 mm thickness is utilized to form a molded, custom shield with a
Simple Simulation Preamble Text Will Be Included With Simple Simulations (.......... Indications): Simple simulation was performed today for the following reasons:
Total Dose (Optional-Please Include Units): 5409.0
Energy (Optional-Please Include Units): 70
Dose Per Fractionation In Cgy (Optional): 270.9
Daily Fractionated Dose (Optional- Include Units): 270.90 cGy
Fractions / Week: 3
Field Size (Applicator): 2.0 cm
Assessment: Appropriate reaction
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
Custom Shielding Afterword Text Will Not Be Included With Simple Simulations (X X Y Cm............): port to correlate with the lesion size, including treatment margin. The custom lead shield is adequate to accommodate the appropriate applicator and provide adequate shielding around the treatment site. Additional shielding (as noted below) is used to protect sensitive, normal tissues.
Depth (Optional-Please Include Units): .85 mm
Treatment Device Design After Initial Simulation Justification (Will Render If Bill For Treatment Devices = Yes): The patient is status post radiation simulation and is evaluated as to the use of additional devices for shielding and placement for radiation therapy.
Computed Treatment Time In Min (Will Render The Same As Calculated Treatment Time If Left Blank): 0.43 min
Treatment Time In Min (Optional): 0.45
Daily Fractionated Dose (Optional- Include Units): 270.45 cGy
Treatment Time In Min (Optional): 0.43
Treatment Margins In Cm: 0.5
Computed Treatment Time In Min (Will Render The Same As Calculated Treatment Time If Left Blank): 0.45 min
Cumulative Dose In Cgy (Optional): 5418.0
Depth (Optional-Please Include Units): 0.97 mm
Time Dose Fractionation (Optional- Include Units If Applicable): 96

## 2021-03-25 NOTE — PROCEDURE: FOLLOW UP FOR NEXT VISIT
Detail Level: Simple
Instructions (Optional): SRT
Instructions (Optional): SRT treatments complete. Six week follow up scheduled for final evaluation. [email or web address]

## 2021-03-28 ENCOUNTER — APPOINTMENT (OUTPATIENT)
Age: 76
Setting detail: DERMATOLOGY
End: 2021-04-11

## 2021-03-28 PROCEDURE — 77336 RADIATION PHYSICS CONSULT: CPT

## 2021-05-05 ENCOUNTER — APPOINTMENT (OUTPATIENT)
Age: 76
Setting detail: DERMATOLOGY
End: 2021-05-08

## 2021-05-05 PROBLEM — C44.311 BASAL CELL CARCINOMA OF SKIN OF NOSE: Status: ACTIVE | Noted: 2021-05-05

## 2021-05-05 PROBLEM — C44.329 SQUAMOUS CELL CARCINOMA OF SKIN OF OTHER PARTS OF FACE: Status: ACTIVE | Noted: 2021-05-05

## 2021-05-05 PROCEDURE — 99212 OFFICE O/P EST SF 10 MIN: CPT

## 2021-05-05 PROCEDURE — OTHER SUPERFICIAL RADIATION TREATMENT: OTHER

## 2021-05-05 PROCEDURE — OTHER TREATMENT REGIMEN: OTHER

## 2021-05-05 PROCEDURE — OTHER FOLLOW UP FOR NEXT VISIT: OTHER

## 2021-05-05 PROCEDURE — G6001 ECHO GUIDANCE RADIOTHERAPY: HCPCS

## 2021-05-05 NOTE — PROCEDURE: SUPERFICIAL RADIATION TREATMENT
Shielding Size (Optional- Include Units): 2.0 cm
Daily Fractionated Dose (Optional- Include Units): 270.45 cGy
Dimensions-X Axis In Cm: 1
Energy (Include Units): 70kV
Bill For Dosimetry/Render Decay And Dose Adjustment Calculation In Note: No
Treatment Time / Fractionation (Optional- Include Units): 0.43 min
Total Number Of Fractions Rx 4: 15
Patient Positioning: Sitting
Pathology Override (Pathology Will Render As Diagnosis Name If Left Blank): BCC nodular
Fractions / Week Rx 3: 5
Dose / Tx In Cgy (Optional): 270.90
Simple Simulation Preamble Text Will Be Included With Simple Simulations (.......... Indications): Simple simulation was performed today for the following reasons:
Field Size (Applicator): 1.5 cm
Validate Note Data (See Information Below): Yes
Day Of The Week Treatment Administered: Thursday
Port Dimensions-X Axis In Cm: 0.8
Total Dose (Optional-Please Include Units): 5409.0
Comments: RTOG 0
Energy (Optional-Please Include Units): 70
Dose / Tx In Cgy (Optional): 270.45
Functional Status: 1 (ambulatory, light activity)
Custom Shielding Preamble Text Will Not Be Included With Simple Simulations (.......... X X Y Cm): A lead shield of 0.762 mm thickness is utilized to form a molded, custom shield with a
Cumulative Dose In Cgy (Optional): 5418.0
Detail Level: Detailed
Energy (Optional-Please Include Units): 70 KV
Simple Simulation Afterword Text Will Be Included With Simple Simulations (Indications............): The patient had a complete consultation regarding all applicable modalities for the treatment of their skin cancer and based on a variety of factors including the type of tumor, size, and location, the relevant medical history as well as local tissue factors, the functional status of the individual, the ability to perform necessary postoperative wound instructions and the need for simultaneous treatments as well as overall wound healing status, it was determined that the patient would begin radiation therapy treatment for skin cancer.  A full simulation and treatment device design was performed including the determination and formulation of appropriate simple and complex devices including lead shield of 0.762 mm thickness to form molded customized shielding to specifically correlate with the lesion size including treatment margin.  The custom lead shield is adequate to accommodate the appropriate applicator and provide adequate shielding around the treatment site.  The specific field applicator, shields, and devices both simple and complex as well as the specific patient setup is outlined below.  The patient was given a full consent for superficial radiation to both verbally and in writing and the full determination of patient's eligibility for treatment and selection is outlined on the patient eligibility and treatment selection form.  The specific superficial radiotherapy prescription was determined and was documented on the superficial radiotherapy prescription form.  A treatment calculation was also performed and documented on the treatment calculation form.  Based on the prescription, the patient was scheduled for a series of fractional treatments.
Custom Shielding Afterword Text Will Not Be Included With Simple Simulations (X X Y Cm............): port to correlate with the lesion size, including treatment margin. The custom lead shield is adequate to accommodate the appropriate applicator and provide adequate shielding around the treatment site. Additional shielding (as noted below) is used to protect sensitive, normal tissues.
Assessment: Appropriate reaction
Fractions / Week: 3
Intro Statement (Will Not Render If Left Blank): No evidence of disease, Asymptomatic
Depth (Optional-Please Include Units): .85 mm
Computed Treatment Time In Min (Will Render The Same As Calculated Treatment Time If Left Blank): 0.45 min
Treatment Margins In Cm: 0.35
Field Size (Applicator): 2.5 cm
Port Dimensions-X Axis In Cm: 2
Dose Per Fractionation In Cgy (Optional): 270.9
Fractionation Number: 20
Treatment Device Design After Initial Simulation Justification (Will Render If Bill For Treatment Devices = Yes): The patient is status post radiation simulation and is evaluated as to the use of additional devices for shielding and placement for radiation therapy.
Time Dose Fractionation (Optional- Include Units If Applicable): 96
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
Depth (Optional-Please Include Units): 0.97 mm
Daily Fractionated Dose (Optional- Include Units): 270.90 cGy
Information: Selecting Yes will display possible errors in your note based on the variables you have selected. This validation is only offered as a suggestion for you. PLEASE NOTE THAT THE VALIDATION TEXT WILL BE REMOVED WHEN YOU FINALIZE YOUR NOTE. IF YOU WANT TO FAX A PRELIMINARY NOTE YOU WILL NEED TO TOGGLE THIS TO 'NO' IF YOU DO NOT WANT IT IN YOUR FAXED NOTE.
Intro Statement (Will Not Render If Left Blank): No evidence of disease, pt is asymptomatic.
Treatment Time In Min (Optional): 0.43
Treatment Time In Min (Optional): 0.45
Treatment Margins In Cm: 0.5
Shielding Size (Optional- Include Units): none

## 2021-05-05 NOTE — PROCEDURE: TREATMENT REGIMEN
Detail Level: Zone
Plan: 6 week Follow-up with u/s\\n\\nPer the request of Dr. Suarez, patient was seen today for Superficial Radiation Therapy management.  A high frequency ultrasound image was acquired prior to evaluation today for three-dimensional evaluation of tumor volume and response to treatment (CPT® ). \\n\\nApproximately 6 weeks after finishing SRT, evaluation and management of SRT based on prescription and cumulative dose for reaction and response to radiation reveals adequate healing and response with pleasing aesthetics results.  No evidence of cancerous lesion on or around treatment site when inspected visually or by ultrasound. RTOG = 0\\nRepopulation value is +++.\\n\\n\\n
Plan: 6 week Follow-up with u/s\\n\\nPer the request of Dr. Suarez, patient was seen today for Superficial Radiation Therapy management.  A high frequency ultrasound image was acquired prior to evaluation today for three-dimensional evaluation of tumor volume and response to treatment (CPT® ). \\n\\nApproximately 6 weeks after finishing SRT, evaluation and management of SRT based on prescription and cumulative dose for reaction and response to radiation reveals adequate healing and response with pleasing aesthetics results.  No evidence of cancerous lesion on or around treatment site when inspected visually or by ultrasound. RTOG = 0\\nRepopulation value is +++.  \\n\\n\\n

## 2025-05-01 ENCOUNTER — APPOINTMENT (OUTPATIENT)
Age: 80
Setting detail: DERMATOLOGY
End: 2025-05-02

## 2025-05-01 DIAGNOSIS — Z85.828 PERSONAL HISTORY OF OTHER MALIGNANT NEOPLASM OF SKIN: ICD-10-CM

## 2025-05-01 DIAGNOSIS — D49.2 NEOPLASM OF UNSPECIFIED BEHAVIOR OF BONE, SOFT TISSUE, AND SKIN: ICD-10-CM

## 2025-05-01 DIAGNOSIS — L82.1 OTHER SEBORRHEIC KERATOSIS: ICD-10-CM

## 2025-05-01 DIAGNOSIS — L57.0 ACTINIC KERATOSIS: ICD-10-CM

## 2025-05-01 DIAGNOSIS — L81.4 OTHER MELANIN HYPERPIGMENTATION: ICD-10-CM

## 2025-05-01 PROCEDURE — 11102 TANGNTL BX SKIN SINGLE LES: CPT

## 2025-05-01 PROCEDURE — OTHER COUNSELING: OTHER

## 2025-05-01 PROCEDURE — OTHER RECOMMENDATIONS: OTHER

## 2025-05-01 PROCEDURE — OTHER BIOPSY BY SHAVE METHOD: OTHER

## 2025-05-01 PROCEDURE — 17000 DESTRUCT PREMALG LESION: CPT | Mod: 59

## 2025-05-01 PROCEDURE — 99214 OFFICE O/P EST MOD 30 MIN: CPT | Mod: 25

## 2025-05-01 PROCEDURE — 17003 DESTRUCT PREMALG LES 2-14: CPT

## 2025-05-01 PROCEDURE — OTHER LIQUID NITROGEN: OTHER

## 2025-05-01 PROCEDURE — OTHER MIPS QUALITY: OTHER

## 2025-05-01 PROCEDURE — OTHER SEPARATE AND IDENTIFIABLE DOCUMENTATION: OTHER

## 2025-05-01 PROCEDURE — 11103 TANGNTL BX SKIN EA SEP/ADDL: CPT

## 2025-05-01 ASSESSMENT — LOCATION SIMPLE DESCRIPTION DERM
LOCATION SIMPLE: SUPERIOR FOREHEAD
LOCATION SIMPLE: LEFT THIGH
LOCATION SIMPLE: NOSE
LOCATION SIMPLE: RIGHT SHOULDER
LOCATION SIMPLE: LEFT HAND
LOCATION SIMPLE: ABDOMEN
LOCATION SIMPLE: POSTERIOR NECK
LOCATION SIMPLE: CHEST
LOCATION SIMPLE: UPPER BACK
LOCATION SIMPLE: RIGHT HAND
LOCATION SIMPLE: LEFT SHOULDER

## 2025-05-01 ASSESSMENT — LOCATION ZONE DERM
LOCATION ZONE: ARM
LOCATION ZONE: NECK
LOCATION ZONE: HAND
LOCATION ZONE: NOSE
LOCATION ZONE: LEG
LOCATION ZONE: TRUNK
LOCATION ZONE: FACE

## 2025-05-01 ASSESSMENT — LOCATION DETAILED DESCRIPTION DERM
LOCATION DETAILED: RIGHT POSTERIOR SHOULDER
LOCATION DETAILED: LEFT ANTERIOR DISTAL THIGH
LOCATION DETAILED: RIGHT ULNAR DORSAL HAND
LOCATION DETAILED: NASAL SUPRATIP
LOCATION DETAILED: PERIUMBILICAL SKIN
LOCATION DETAILED: LEFT RADIAL DORSAL HAND
LOCATION DETAILED: 4TH WEB SPACE LEFT HAND
LOCATION DETAILED: LEFT LATERAL TRAPEZIAL NECK
LOCATION DETAILED: SUPERIOR THORACIC SPINE
LOCATION DETAILED: SUPERIOR MID FOREHEAD
LOCATION DETAILED: LEFT POSTERIOR SHOULDER
LOCATION DETAILED: LEFT LATERAL SUPERIOR CHEST

## 2025-05-01 NOTE — PROCEDURE: BIOPSY BY SHAVE METHOD
Detail Level: Detailed
Depth Of Biopsy: dermis
Was A Bandage Applied: Yes
Size Of Lesion In Cm: 0.4
X Size Of Lesion In Cm: 0
Biopsy Type: H and E
Biopsy Method: Dermablade
Anesthesia Type: 1% lidocaine with epinephrine
Anesthesia Volume In Cc: 0.5
Hemostasis: Drysol
Wound Care: Petrolatum
Dressing: bandage
Destruction After The Procedure: No
Type Of Destruction Used: Curettage
Curettage Text: The wound bed was treated with curettage after the biopsy was performed.
Cryotherapy Text: The wound bed was treated with cryotherapy after the biopsy was performed.
Electrodesiccation Text: The wound bed was treated with electrodesiccation after the biopsy was performed.
Electrodesiccation And Curettage Text: The wound bed was treated with electrodesiccation and curettage after the biopsy was performed.
Silver Nitrate Text: The wound bed was treated with silver nitrate after the biopsy was performed.
Lab: [id number]
Lab Facility: [id number]
Medical Necessity Information: It is in your best interest to select a reason for this procedure from the list below. All of these items fulfill various CMS LCD requirements except the new and changing color options.
Consent: Written consent was obtained and risks were reviewed including but not limited to scarring, infection, bleeding, scabbing, incomplete removal, nerve damage and allergy to anesthesia.
Post-Care Instructions: I reviewed with the patient in detail post-care instructions. Patient is to keep the biopsy site dry overnight, and then apply bacitracin twice daily until healed. Patient may apply hydrogen peroxide soaks to remove any crusting.
Notification Instructions: Patient will be notified of biopsy results. However, patient instructed to call the office if not contacted within 2 weeks.
Billing Type: Third-Party Bill
Information: Selecting Yes will display possible errors in your note based on the variables you have selected. This validation is only offered as a suggestion for you. PLEASE NOTE THAT THE VALIDATION TEXT WILL BE REMOVED WHEN YOU FINALIZE YOUR NOTE. IF YOU WANT TO FAX A PRELIMINARY NOTE YOU WILL NEED TO TOGGLE THIS TO 'NO' IF YOU DO NOT WANT IT IN YOUR FAXED NOTE.
Size Of Lesion In Cm: 0.3

## 2025-06-24 ENCOUNTER — APPOINTMENT (OUTPATIENT)
Age: 80
Setting detail: DERMATOLOGY
End: 2025-06-24

## 2025-06-24 VITALS — TEMPERATURE: 98.6 F

## 2025-06-24 PROBLEM — C44.629 SQUAMOUS CELL CARCINOMA OF SKIN OF LEFT UPPER LIMB, INCLUDING SHOULDER: Status: ACTIVE | Noted: 2025-06-24

## 2025-06-24 PROCEDURE — OTHER COUNSELING: OTHER

## 2025-06-24 PROCEDURE — OTHER CONSULTATION EXCISION: OTHER

## 2025-06-24 PROCEDURE — OTHER CONSULTATION FOR RADIOTHERAPY: OTHER

## 2025-06-24 PROCEDURE — OTHER MIPS QUALITY: OTHER

## 2025-06-24 PROCEDURE — 99213 OFFICE O/P EST LOW 20 MIN: CPT

## 2025-07-24 ENCOUNTER — APPOINTMENT (OUTPATIENT)
Age: 80
Setting detail: DERMATOLOGY
End: 2025-07-25

## 2025-07-24 PROBLEM — C44.629 SQUAMOUS CELL CARCINOMA OF SKIN OF LEFT UPPER LIMB, INCLUDING SHOULDER: Status: ACTIVE | Noted: 2025-07-24

## 2025-07-24 PROCEDURE — 12042 INTMD RPR N-HF/GENIT2.6-7.5: CPT

## 2025-07-24 PROCEDURE — 11623 EXC S/N/H/F/G MAL+MRG 2.1-3: CPT

## 2025-07-24 PROCEDURE — OTHER EXCISION: OTHER

## 2025-07-24 PROCEDURE — OTHER MIPS QUALITY: OTHER

## 2025-08-07 ENCOUNTER — APPOINTMENT (OUTPATIENT)
Age: 80
Setting detail: DERMATOLOGY
End: 2025-08-07

## 2025-08-07 DIAGNOSIS — Z48.02 ENCOUNTER FOR REMOVAL OF SUTURES: ICD-10-CM

## 2025-08-07 PROCEDURE — OTHER SUTURE REMOVAL (GLOBAL PERIOD): OTHER

## 2025-08-07 PROCEDURE — OTHER MIPS QUALITY: OTHER

## 2025-08-07 ASSESSMENT — LOCATION DETAILED DESCRIPTION DERM: LOCATION DETAILED: 4TH WEB SPACE LEFT HAND

## 2025-08-07 ASSESSMENT — LOCATION SIMPLE DESCRIPTION DERM: LOCATION SIMPLE: LEFT HAND

## 2025-08-07 ASSESSMENT — LOCATION ZONE DERM: LOCATION ZONE: HAND
